# Patient Record
Sex: MALE | Race: WHITE | Employment: OTHER | URBAN - METROPOLITAN AREA
[De-identification: names, ages, dates, MRNs, and addresses within clinical notes are randomized per-mention and may not be internally consistent; named-entity substitution may affect disease eponyms.]

---

## 2018-09-14 PROBLEM — I77.1 TORTUOUS AORTA: Status: ACTIVE | Noted: 2018-09-14

## 2018-09-14 PROBLEM — I77.1 TORTUOUS AORTA (HCC): Status: ACTIVE | Noted: 2018-09-14

## 2019-06-27 ENCOUNTER — HOSPITAL ENCOUNTER (OUTPATIENT)
Facility: HOSPITAL | Age: 84
Setting detail: OBSERVATION
Discharge: HOME | End: 2019-06-28
Attending: EMERGENCY MEDICINE | Admitting: INTERNAL MEDICINE
Payer: MEDICARE

## 2019-06-27 ENCOUNTER — APPOINTMENT (OUTPATIENT)
Dept: CT IMAGING | Facility: HOSPITAL | Age: 84
End: 2019-06-27
Attending: EMERGENCY MEDICINE
Payer: MEDICARE

## 2019-06-27 ENCOUNTER — APPOINTMENT (OUTPATIENT)
Dept: GENERAL RADIOLOGY | Facility: HOSPITAL | Age: 84
End: 2019-06-27
Attending: HOSPITALIST
Payer: MEDICARE

## 2019-06-27 DIAGNOSIS — S01.01XA LACERATION OF SCALP, INITIAL ENCOUNTER: ICD-10-CM

## 2019-06-27 DIAGNOSIS — S09.90XA INJURY OF HEAD, INITIAL ENCOUNTER: Primary | ICD-10-CM

## 2019-06-27 DIAGNOSIS — N28.9 RENAL INSUFFICIENCY: ICD-10-CM

## 2019-06-27 DIAGNOSIS — R55 SYNCOPE AND COLLAPSE: ICD-10-CM

## 2019-06-27 LAB
ALBUMIN SERPL-MCNC: 4.1 G/DL (ref 3.4–5)
ALBUMIN/GLOB SERPL: 1.3 {RATIO} (ref 1–2)
ALP LIVER SERPL-CCNC: 84 U/L (ref 45–117)
ALT SERPL-CCNC: 20 U/L (ref 16–61)
ANION GAP SERPL CALC-SCNC: 5 MMOL/L (ref 0–18)
AST SERPL-CCNC: 14 U/L (ref 15–37)
ATRIAL RATE: 67 BPM
BASOPHILS # BLD AUTO: 0.05 X10(3) UL (ref 0–0.2)
BASOPHILS NFR BLD AUTO: 0.5 %
BILIRUB SERPL-MCNC: 0.6 MG/DL (ref 0.1–2)
BILIRUB UR QL STRIP.AUTO: NEGATIVE
BUN BLD-MCNC: 24 MG/DL (ref 7–18)
BUN/CREAT SERPL: 13.6 (ref 10–20)
CALCIUM BLD-MCNC: 9.2 MG/DL (ref 8.5–10.1)
CHLORIDE SERPL-SCNC: 108 MMOL/L (ref 98–112)
CLARITY UR REFRACT.AUTO: CLEAR
CO2 SERPL-SCNC: 28 MMOL/L (ref 21–32)
CREAT BLD-MCNC: 1.76 MG/DL (ref 0.7–1.3)
DEPRECATED RDW RBC AUTO: 43.6 FL (ref 35.1–46.3)
EOSINOPHIL # BLD AUTO: 0.07 X10(3) UL (ref 0–0.7)
EOSINOPHIL NFR BLD AUTO: 0.7 %
ERYTHROCYTE [DISTWIDTH] IN BLOOD BY AUTOMATED COUNT: 13.2 % (ref 11–15)
GLOBULIN PLAS-MCNC: 3.1 G/DL (ref 2.8–4.4)
GLUCOSE BLD-MCNC: 133 MG/DL (ref 70–99)
GLUCOSE UR STRIP.AUTO-MCNC: NEGATIVE MG/DL
HCT VFR BLD AUTO: 45.2 % (ref 39–53)
HGB BLD-MCNC: 14.9 G/DL (ref 13–17.5)
IMM GRANULOCYTES # BLD AUTO: 0.06 X10(3) UL (ref 0–1)
IMM GRANULOCYTES NFR BLD: 0.6 %
KETONES UR STRIP.AUTO-MCNC: NEGATIVE MG/DL
LEUKOCYTE ESTERASE UR QL STRIP.AUTO: NEGATIVE
LYMPHOCYTES # BLD AUTO: 0.81 X10(3) UL (ref 1–4)
LYMPHOCYTES NFR BLD AUTO: 7.7 %
M PROTEIN MFR SERPL ELPH: 7.2 G/DL (ref 6.4–8.2)
MCH RBC QN AUTO: 29.7 PG (ref 26–34)
MCHC RBC AUTO-ENTMCNC: 33 G/DL (ref 31–37)
MCV RBC AUTO: 90.2 FL (ref 80–100)
MONOCYTES # BLD AUTO: 0.72 X10(3) UL (ref 0.1–1)
MONOCYTES NFR BLD AUTO: 6.8 %
NEUTROPHILS # BLD AUTO: 8.82 X10 (3) UL (ref 1.5–7.7)
NEUTROPHILS # BLD AUTO: 8.82 X10(3) UL (ref 1.5–7.7)
NEUTROPHILS NFR BLD AUTO: 83.7 %
NITRITE UR QL STRIP.AUTO: NEGATIVE
OSMOLALITY SERPL CALC.SUM OF ELEC: 298 MOSM/KG (ref 275–295)
P AXIS: 55 DEGREES
P-R INTERVAL: 184 MS
PH UR STRIP.AUTO: 7 [PH] (ref 4.5–8)
PLATELET # BLD AUTO: 161 10(3)UL (ref 150–450)
POTASSIUM SERPL-SCNC: 4.2 MMOL/L (ref 3.5–5.1)
PROT UR STRIP.AUTO-MCNC: NEGATIVE MG/DL
Q-T INTERVAL: 436 MS
QRS DURATION: 92 MS
QTC CALCULATION (BEZET): 460 MS
R AXIS: 9 DEGREES
RBC # BLD AUTO: 5.01 X10(6)UL (ref 3.8–5.8)
SODIUM SERPL-SCNC: 141 MMOL/L (ref 136–145)
SP GR UR STRIP.AUTO: 1.01 (ref 1–1.03)
T AXIS: 43 DEGREES
T4 FREE SERPL-MCNC: 0.9 NG/DL (ref 0.8–1.7)
TSI SER-ACNC: 0.78 MIU/ML (ref 0.36–3.74)
UROBILINOGEN UR STRIP.AUTO-MCNC: <2 MG/DL
VENTRICULAR RATE: 67 BPM
WBC # BLD AUTO: 10.5 X10(3) UL (ref 4–11)

## 2019-06-27 PROCEDURE — 81001 URINALYSIS AUTO W/SCOPE: CPT | Performed by: HOSPITALIST

## 2019-06-27 PROCEDURE — 93005 ELECTROCARDIOGRAM TRACING: CPT

## 2019-06-27 PROCEDURE — 93010 ELECTROCARDIOGRAM REPORT: CPT

## 2019-06-27 PROCEDURE — 99285 EMERGENCY DEPT VISIT HI MDM: CPT

## 2019-06-27 PROCEDURE — 90471 IMMUNIZATION ADMIN: CPT

## 2019-06-27 PROCEDURE — 85025 COMPLETE CBC W/AUTO DIFF WBC: CPT | Performed by: EMERGENCY MEDICINE

## 2019-06-27 PROCEDURE — 71045 X-RAY EXAM CHEST 1 VIEW: CPT | Performed by: HOSPITALIST

## 2019-06-27 PROCEDURE — 70450 CT HEAD/BRAIN W/O DYE: CPT | Performed by: EMERGENCY MEDICINE

## 2019-06-27 PROCEDURE — 84439 ASSAY OF FREE THYROXINE: CPT | Performed by: HOSPITALIST

## 2019-06-27 PROCEDURE — 96361 HYDRATE IV INFUSION ADD-ON: CPT

## 2019-06-27 PROCEDURE — 96360 HYDRATION IV INFUSION INIT: CPT

## 2019-06-27 PROCEDURE — 84443 ASSAY THYROID STIM HORMONE: CPT | Performed by: HOSPITALIST

## 2019-06-27 PROCEDURE — 80053 COMPREHEN METABOLIC PANEL: CPT | Performed by: EMERGENCY MEDICINE

## 2019-06-27 RX ORDER — AMLODIPINE BESYLATE 10 MG/1
10 TABLET ORAL DAILY
Status: DISCONTINUED | OUTPATIENT
Start: 2019-06-28 | End: 2019-06-28

## 2019-06-27 RX ORDER — TETANUS AND DIPHTHERIA TOXOIDS ADSORBED 2; 2 [LF]/.5ML; [LF]/.5ML
0.5 INJECTION INTRAMUSCULAR ONCE
Status: COMPLETED | OUTPATIENT
Start: 2019-06-27 | End: 2019-06-27

## 2019-06-27 RX ORDER — AMLODIPINE BESYLATE 10 MG/1
10 TABLET ORAL DAILY
COMMUNITY
End: 2020-02-04

## 2019-06-27 RX ORDER — ALFUZOSIN HYDROCHLORIDE 10 MG/1
10 TABLET, EXTENDED RELEASE ORAL NIGHTLY
Status: DISCONTINUED | OUTPATIENT
Start: 2019-06-27 | End: 2019-06-28

## 2019-06-27 RX ORDER — FINASTERIDE 5 MG/1
5 TABLET, FILM COATED ORAL NIGHTLY
Status: DISCONTINUED | OUTPATIENT
Start: 2019-06-27 | End: 2019-06-28

## 2019-06-27 RX ORDER — LISINOPRIL 10 MG/1
10 TABLET ORAL DAILY
Status: ON HOLD | COMMUNITY
End: 2019-06-28

## 2019-06-27 RX ORDER — OXYBUTYNIN CHLORIDE 5 MG/1
5 TABLET, EXTENDED RELEASE ORAL NIGHTLY
Status: DISCONTINUED | OUTPATIENT
Start: 2019-06-27 | End: 2019-06-28

## 2019-06-27 RX ORDER — ALFUZOSIN HYDROCHLORIDE 10 MG/1
10 TABLET, EXTENDED RELEASE ORAL NIGHTLY
COMMUNITY
End: 2019-08-15

## 2019-06-27 RX ORDER — FINASTERIDE 5 MG/1
5 TABLET, FILM COATED ORAL NIGHTLY
COMMUNITY
End: 2020-02-04

## 2019-06-27 RX ORDER — OXYBUTYNIN CHLORIDE 5 MG/1
5 TABLET, EXTENDED RELEASE ORAL NIGHTLY
COMMUNITY
End: 2019-07-02

## 2019-06-27 RX ORDER — POLYETHYLENE GLYCOL 3350 17 G/17G
17 POWDER, FOR SOLUTION ORAL DAILY
COMMUNITY
End: 2021-01-08 | Stop reason: ALTCHOICE

## 2019-06-27 NOTE — H&P
[facility] Hospitalist History and Physical      CC: Fall/syncope    PCP: Musa Kent MD    History of Present Illness: Patient is a 81year old male with PMH sig for BPH and HTN CKD presents after episode of syncope and fall.  He was working in his garage nightly. Disp:  Rfl:    amLODIPine Besylate 10 MG Oral Tab Take 10 mg by mouth daily. Disp:  Rfl:    lisinopril 10 MG Oral Tab Take 10 mg by mouth daily. Disp:  Rfl:    PEG 3350 Oral Powd Pack Take 17 g by mouth daily.  Disp:  Rfl:    Alfuzosin HCl ER 10 06/27/2019    ALB 4.1 06/27/2019    ALKPHO 84 06/27/2019    BILT 0.6 06/27/2019    TP 7.2 06/27/2019    AST 14 06/27/2019    ALT 20 06/27/2019       Above labs reviewed.     Radiology:    I independently reviewed the following studies from admit date:     E currently    # Hx of HTN  - On lisinopril and norvasc at home- repots some lower BP readings at home lately  - Hold and monitor BP    # CKD: baseline, monitor    # BPH: UA looks OK, home meds    # Proph: SCD Sandy Ormond, MD  [facility] Hospitalist  Pager: [phone]

## 2019-06-27 NOTE — ED INITIAL ASSESSMENT (HPI)
Per pts son pt stated he was dizzy and he passed out and fell on his back. Pt was outside working outside. Per pts son pt did not remember the episode. Pt states he does not remember what happened.      Pt has laceration and bump on the back of

## 2019-06-27 NOTE — PROGRESS NOTES
06/27/19 1801 06/27/19 1802 06/27/19 1804   Vital Signs   /70 156/67 148/66   BP Location Left arm Left arm Left arm   BP Method Automatic Automatic Automatic   Patient Position Lying Sitting Standing

## 2019-06-27 NOTE — PLAN OF CARE
Received from ED into [address] [place] [address]  Admission navigator completed. Orders reviewed. CXR completed, Echo pending. Will monitor.

## 2019-06-27 NOTE — ED PROVIDER NOTES
Patient Seen in: [facility] Emergency Department    History   Patient presents with:  Syncope (cardiovascular, neurologic)    Stated Complaint: syncope    81-year-old male who presents to the emergency room with family after having a syncopal episod Performed by Ava Albright MD at [facility] N/A 1/29/2016    Performed by Ava Albright MD at [facility] MAIN OR   • CYSTOSCOPY, STENT EXCHANGE Bilateral 9/16/2016    Performed by Ava Albright MD at [facility]: He is oriented to person, place, and time. He appears well-developed and well-nourished. HENT:   Head: Normocephalic and atraumatic.        Right Ear: External ear normal.   Left Ear: External ear normal.   Mouth/Throat: Oropharynx is arcelia Rate, intervals and axes as noted on EKG Report. Rate: 67  Rhythm: Sinus Rhythm, QT has shortened  Reading: leela  Ct Brain Or Head (95825)    Result Date: 6/27/2019  PROCEDURE:  CT BRAIN OR HEAD (24861)  COMPARISON:  None.   INDICATIONS:  head pain or injury Head injury S09.90XA 6/27/2019 Unknown

## 2019-06-28 ENCOUNTER — APPOINTMENT (OUTPATIENT)
Dept: CV DIAGNOSTICS | Facility: HOSPITAL | Age: 84
End: 2019-06-28
Attending: HOSPITALIST
Payer: MEDICARE

## 2019-06-28 ENCOUNTER — APPOINTMENT (OUTPATIENT)
Dept: ULTRASOUND IMAGING | Facility: HOSPITAL | Age: 84
End: 2019-06-28
Attending: HOSPITALIST
Payer: MEDICARE

## 2019-06-28 VITALS
WEIGHT: 158.06 LBS | BODY MASS INDEX: 23.95 KG/M2 | RESPIRATION RATE: 18 BRPM | HEART RATE: 64 BPM | OXYGEN SATURATION: 97 % | DIASTOLIC BLOOD PRESSURE: 62 MMHG | HEIGHT: 68 IN | TEMPERATURE: 98 F | SYSTOLIC BLOOD PRESSURE: 117 MMHG

## 2019-06-28 PROCEDURE — 93306 TTE W/DOPPLER COMPLETE: CPT | Performed by: HOSPITALIST

## 2019-06-28 PROCEDURE — 93880 EXTRACRANIAL BILAT STUDY: CPT | Performed by: HOSPITALIST

## 2019-06-28 NOTE — PROGRESS NOTES
Assumed care of patient at 07:30am  Pt A/O x 4. VSS, NSR per tele, SPO2 > 95% on RA  Pt denies any pain or discomfort  Posterior head laceration SIMON, clean and dry  ECHO and US carotids completed  Plan to discharge home this afternoon/evening with wife.

## 2019-06-28 NOTE — DISCHARGE SUMMARY
General Medicine Discharge Summary     Patient ID:  Xiomara Palomo  81year old  9/10/1935    Admit date: 6/27/2019    Discharge date and time:  6/28/19    Attending Physician: Santiago Desouza MD have decreased home BP meds and will send on event monitor.  FU with Cards as outpatient when monitor read.     # Head trauma  - CT head OK, local wound care  - Discussed concussive symptoms- confusion post fall may have been related to minor concussion  -

## 2019-06-28 NOTE — PROGRESS NOTES
NURSING DISCHARGE NOTE    Patient and wife given and reviewed discharge orders. All questions answered, verbalized understanding. Signa and symptoms to call 911 and return to hospital reviewed. All questions answered, verbalized understanding.   Wesley

## 2019-06-28 NOTE — PLAN OF CARE
Assumed care at 1900, patient alert and oriented X 4, forgetful at times  Patient denies pain  Laceration and bruising to back of head  Plan for echo and carotid us  Updated patient and family on POC         Problem: SAFETY ADULT - FALL  Goal: Free from fa

## 2019-06-28 NOTE — PLAN OF CARE
Problem: Patient/Family Goals  Goal: Patient/Family Long Term Goal  Description  Patient's Long Term Goal: Discharge home    Interventions:  - ECHO and US carotids completed  - follow-up appointments   - See additional Care Plan goals for specific interv patient needs post-hospital services based on physician/LIP order or complex needs related to functional status, cognitive ability or social support system  Outcome: Adequate for Discharge

## 2019-07-01 ENCOUNTER — HOSPITAL ENCOUNTER (OUTPATIENT)
Dept: CV DIAGNOSTICS | Facility: HOSPITAL | Age: 84
Discharge: HOME | End: 2019-07-01
Attending: NURSE PRACTITIONER
Payer: MEDICARE

## 2019-07-01 DIAGNOSIS — R55 SYNCOPE AND COLLAPSE: ICD-10-CM

## 2019-07-01 PROCEDURE — 93272 ECG/REVIEW INTERPRET ONLY: CPT | Performed by: NURSE PRACTITIONER

## 2019-07-01 PROCEDURE — 93270 REMOTE 30 DAY ECG REV/REPORT: CPT | Performed by: NURSE PRACTITIONER

## 2019-07-01 PROCEDURE — 93271 ECG/MONITORING AND ANALYSIS: CPT | Performed by: NURSE PRACTITIONER

## 2019-07-09 PROBLEM — N18.30 CKD (CHRONIC KIDNEY DISEASE) STAGE 3, GFR 30-59 ML/MIN (HCC): Status: ACTIVE | Noted: 2019-07-09

## 2019-07-09 PROBLEM — I77.9 LEFT-SIDED CAROTID ARTERY DISEASE (HCC): Status: ACTIVE | Noted: 2019-07-09

## 2019-07-09 PROBLEM — I77.9 LEFT-SIDED CAROTID ARTERY DISEASE: Status: ACTIVE | Noted: 2019-07-09

## 2019-07-19 PROBLEM — R55 SYNCOPE AND COLLAPSE: Status: RESOLVED | Noted: 2019-06-27 | Resolved: 2019-07-19

## 2021-01-07 PROBLEM — I77.9 LEFT-SIDED CAROTID ARTERY DISEASE (HCC): Status: RESOLVED | Noted: 2019-07-09 | Resolved: 2021-01-07

## 2021-01-07 PROBLEM — I77.9 LEFT-SIDED CAROTID ARTERY DISEASE: Status: RESOLVED | Noted: 2019-07-09 | Resolved: 2021-01-07

## 2021-01-07 PROBLEM — G31.9 CEREBRAL ATROPHY (HCC): Status: ACTIVE | Noted: 2021-01-07

## 2021-01-07 PROBLEM — I65.29 CAROTID STENOSIS: Status: ACTIVE | Noted: 2021-01-07

## 2021-01-07 PROBLEM — G31.9 CEREBRAL ATROPHY: Status: ACTIVE | Noted: 2021-01-07

## 2021-12-11 ENCOUNTER — APPOINTMENT (OUTPATIENT)
Dept: GENERAL RADIOLOGY | Facility: HOSPITAL | Age: 86
End: 2021-12-11
Attending: EMERGENCY MEDICINE
Payer: MEDICARE

## 2021-12-11 ENCOUNTER — HOSPITAL ENCOUNTER (OUTPATIENT)
Facility: HOSPITAL | Age: 86
Setting detail: OBSERVATION
Discharge: HOME | End: 2021-12-13
Attending: EMERGENCY MEDICINE | Admitting: HOSPITALIST
Payer: MEDICARE

## 2021-12-11 DIAGNOSIS — R55 SYNCOPE AND COLLAPSE: Primary | ICD-10-CM

## 2021-12-11 PROBLEM — R73.9 HYPERGLYCEMIA: Status: ACTIVE | Noted: 2021-12-11

## 2021-12-11 PROCEDURE — 99285 EMERGENCY DEPT VISIT HI MDM: CPT | Performed by: EMERGENCY MEDICINE

## 2021-12-11 PROCEDURE — 96361 HYDRATE IV INFUSION ADD-ON: CPT | Performed by: EMERGENCY MEDICINE

## 2021-12-11 PROCEDURE — 80053 COMPREHEN METABOLIC PANEL: CPT | Performed by: EMERGENCY MEDICINE

## 2021-12-11 PROCEDURE — 84484 ASSAY OF TROPONIN QUANT: CPT | Performed by: EMERGENCY MEDICINE

## 2021-12-11 PROCEDURE — 81003 URINALYSIS AUTO W/O SCOPE: CPT | Performed by: EMERGENCY MEDICINE

## 2021-12-11 PROCEDURE — 93010 ELECTROCARDIOGRAM REPORT: CPT | Performed by: EMERGENCY MEDICINE

## 2021-12-11 PROCEDURE — B246ZZZ ULTRASONOGRAPHY OF RIGHT AND LEFT HEART: ICD-10-PCS | Performed by: INTERNAL MEDICINE

## 2021-12-11 PROCEDURE — 93005 ELECTROCARDIOGRAM TRACING: CPT

## 2021-12-11 PROCEDURE — 85025 COMPLETE CBC W/AUTO DIFF WBC: CPT | Performed by: EMERGENCY MEDICINE

## 2021-12-11 PROCEDURE — 71045 X-RAY EXAM CHEST 1 VIEW: CPT | Performed by: EMERGENCY MEDICINE

## 2021-12-11 PROCEDURE — 96360 HYDRATION IV INFUSION INIT: CPT | Performed by: EMERGENCY MEDICINE

## 2021-12-11 RX ORDER — FINASTERIDE 5 MG/1
5 TABLET, FILM COATED ORAL DAILY
Status: DISCONTINUED | OUTPATIENT
Start: 2021-12-12 | End: 2021-12-13

## 2021-12-11 RX ORDER — ENOXAPARIN SODIUM 100 MG/ML
40 INJECTION SUBCUTANEOUS DAILY
Status: DISCONTINUED | OUTPATIENT
Start: 2021-12-11 | End: 2021-12-13

## 2021-12-11 RX ORDER — AMLODIPINE BESYLATE 5 MG/1
10 TABLET ORAL DAILY
Status: DISCONTINUED | OUTPATIENT
Start: 2021-12-12 | End: 2021-12-13

## 2021-12-11 RX ORDER — TAMSULOSIN HYDROCHLORIDE 0.4 MG/1
0.4 CAPSULE ORAL DAILY
Refills: 3 | Status: DISCONTINUED | OUTPATIENT
Start: 2021-12-12 | End: 2021-12-13

## 2021-12-11 NOTE — H&P
[facility] Hospitalist H&P/Consult note       CC: Patient presents with:  Syncope       PCP: Geno Bruce MD    History of Present Illness: Patient is a 81year old male with PMH sig for htn, hld, bph, here for syncope    He was going up the stairs from ba Packs/day: 0.00        Quit date: 2015        Years since quittin.0      Smokeless tobacco: Former User      Tobacco comment: chewed for 40 yrs    Alcohol use: Yes      Comment: occasionally       Fam Hx  Family History   Problem Relation Age of O 6/27/2019, 6:34 PM.  [facility] , XR, XR CHEST AP PORTABLE  (CPT=71010), 1/11/2016, 0:06 AM.  INDICATIONS:  syncope  PATIENT STATED HISTORY: (As transcribed by Technologist)  Patient offered no additional history at this time.     FINDINGS:  Heart size is withi

## 2021-12-11 NOTE — PROGRESS NOTES
12/11/21 1441 12/11/21 1442 12/11/21 1443   Vital Signs   Temp 98.2 °F (36.8 °C)  --   --    Pulse 77 92 91   Cardiac Rhythm NSR NSR NSR   Resp 17 21 16   /71 (!) 125/108 145/63   MAP (mmHg) 87 112 81   BP Location Left arm Left arm Left arm   BP

## 2021-12-11 NOTE — ED PROVIDER NOTES
Patient Seen in: [facility] Emergency Department      History   Patient presents with:  Syncope    Stated Complaint: syncope     Subjective:   HPI    Patient presents after syncopal episode. The wife gives a lot of the history.   The patient had mad • OTHER SURGICAL HISTORY  16    Cysto, TURP - Dr. Isa Medina                Social History    Tobacco Use      Smoking status: Former Smoker        Packs/day: 0.00        Quit date: 2015        Years since quittin.0      Smokeless tobacco: Normal   REDRAW POTASSIUM (P) - Normal   RAPID SARS-COV-2 BY PCR - Normal   CBC WITH DIFFERENTIAL WITH PLATELET    Narrative: The following orders were created for panel order CBC With Differential With Platelet.   Procedure for further observation to Dr. Lexis Cm from the [facility] service.   Admission disposition: 12/11/2021 11:36 AM                    Disposition and Plan     Clinical Impression:  Syncope and collapse  (primary encounter diagnosis)     Disposition:  A

## 2021-12-11 NOTE — ED INITIAL ASSESSMENT (HPI)
Pt presents from home via EMS. Per ems, pt had syncopal episode at home. Blood sugar 116. Pt states he \"feels pretty good, i'm a little dizzy. I have to poop. \"

## 2021-12-11 NOTE — ED QUICK NOTES
Orders for admission, patient is aware of plan and ready to go upstairs. Any questions, please call ED RN Wang Bryant at extension [phone].      Patient Covid vaccination status: Unvaccinated     COVID Test Ordered in ED: Rapid SARS-CoV-2 by PCR    COVID Suspicio

## 2021-12-12 ENCOUNTER — APPOINTMENT (OUTPATIENT)
Dept: CV DIAGNOSTICS | Facility: HOSPITAL | Age: 86
End: 2021-12-12
Attending: HOSPITALIST
Payer: MEDICARE

## 2021-12-12 ENCOUNTER — APPOINTMENT (OUTPATIENT)
Dept: ULTRASOUND IMAGING | Facility: HOSPITAL | Age: 86
End: 2021-12-12
Attending: HOSPITALIST
Payer: MEDICARE

## 2021-12-12 PROCEDURE — 93306 TTE W/DOPPLER COMPLETE: CPT | Performed by: HOSPITALIST

## 2021-12-12 PROCEDURE — 97161 PT EVAL LOW COMPLEX 20 MIN: CPT

## 2021-12-12 PROCEDURE — 83735 ASSAY OF MAGNESIUM: CPT | Performed by: HOSPITALIST

## 2021-12-12 PROCEDURE — 93880 EXTRACRANIAL BILAT STUDY: CPT | Performed by: HOSPITALIST

## 2021-12-12 PROCEDURE — 80048 BASIC METABOLIC PNL TOTAL CA: CPT | Performed by: HOSPITALIST

## 2021-12-12 PROCEDURE — 97116 GAIT TRAINING THERAPY: CPT

## 2021-12-12 NOTE — PLAN OF CARE
Assumed care of patient at [address]. Alert and oriented x4. Denies any dizziness or light-headedness. Patient is very eager to go home tomorrow. NSR on telemetry. On room air. BM today. Continent of bowel and bladder. Denies any pain.  Fall precautions in place

## 2021-12-12 NOTE — PROGRESS NOTES
[facility] Hospitalist Progress Note     CC: Hospital Follow up    PCP: Annamarie Lloyd MD    Late note entry seen and examined 12/12   Assessment/Plan:     Principal Problem:    Syncope and collapse  Active Problems:    Hyperglycemia    81year old male with 30.1   MCHC 33.7   RDW 13.0   NEPRELIM 2.84   WBC 5.5   .0         Recent Labs   Lab 12/11/21  0853 12/11/21  0957 12/12/21  0630   *  --  100*   BUN 14  --  12   CREATSERUM 1.41*  --  1.31*   GFRAA 52*  --  57*   GFRNAA 45*  --  49*   CA 8.7

## 2021-12-12 NOTE — PHYSICAL THERAPY NOTE
PHYSICAL THERAPY EVALUATION - INPATIENT     Room Number: 1803/2785-J  Evaluation Date: 12/12/2021  Type of Evaluation: Initial  Physician Order: PT Eval and Treat    Presenting Problem: syncope  Co-Morbidities : HTN, HLD, BPH  Reason for Therapy:  Mob extremity ROM is within functional limits     Lower extremity strength is within functional limits       BALANCE  Static Sitting: Normal  Dynamic Sitting: Normal  Static Standing: Normal  Dynamic Standing: Normal    ADDITIONAL TESTS Provided:  Discussed role of PT. Patient was instructed on safety during bed mobility/transfers/gait/standing/stair negotiation, slow position change. Patient End of Session: In bed;Needs met;Call light within reach;RN aware of session/findings; All pat

## 2021-12-12 NOTE — PROGRESS NOTES
12/12/21 1311   Clinical Encounter Type   Visited With Patient; Health care provider   Routine Visit Introduction   Continue Visiting No   Responded to consult for POLST. RN stated that patient is decisional at this time. Patient signed POLST.   Mignon

## 2021-12-12 NOTE — PLAN OF CARE
NSR on telemetry. VSS. No c/o cardiac symptoms. Ambulating with standby assist in marcos and denies feeling lightheaded or dizzy. Echo completed this A.M. Awaiting carotid ultra sound. On room air. SPO2 remaining >90%. No c/o shortness of breath.  Lung so

## 2021-12-13 VITALS
BODY MASS INDEX: 24.26 KG/M2 | TEMPERATURE: 98 F | RESPIRATION RATE: 18 BRPM | DIASTOLIC BLOOD PRESSURE: 73 MMHG | HEIGHT: 68 IN | WEIGHT: 160.06 LBS | HEART RATE: 56 BPM | OXYGEN SATURATION: 99 % | SYSTOLIC BLOOD PRESSURE: 156 MMHG

## 2021-12-13 NOTE — PROGRESS NOTES
Up walking in the hallway. Denies c/o discomfort. No sob room air. Wife on bedside. DC instruction given-verbalized understanding. DC tele. dc hl.

## 2021-12-13 NOTE — DISCHARGE SUMMARY
General Medicine Discharge Summary     Patient ID:  Jack Oviedo  81year old  9/10/1935    Admit date: 12/11/2021    Discharge date and time: 12/13/2021 11:48 AM     Attending Physician: No att. providers found     Consults: IP CONSULT TO [address] [place] [address] daily.     finasteride 5 MG Tabs  Commonly known as: PROSCAR  Take 1 tablet (5 mg total) by mouth daily. FU   Follow-up Information     48 hour heart monitor On 12/15/2021.     Why: @ 1:30pm for placement of monitor  Contact information:  Jose D Norwood

## 2021-12-13 NOTE — PLAN OF CARE
Patient AOX4. O2 sat >90% on room air. NSR on tele. VSS. Denies pain or dizziness. Awaiting echo results. Patient expresses desire to go home tomorrow. Will endorse to care team in a.m. Patient updated on plan of care.     Problem: [address]

## 2022-02-11 PROBLEM — I12.9 HYPERTENSIVE KIDNEY DISEASE WITH CKD (CHRONIC KIDNEY DISEASE): Status: ACTIVE | Noted: 2022-02-11

## 2022-02-11 PROBLEM — R55 SYNCOPE AND COLLAPSE: Status: RESOLVED | Noted: 2021-12-11 | Resolved: 2022-02-11

## 2022-03-17 ENCOUNTER — HOSPITAL ENCOUNTER (OUTPATIENT)
Facility: HOSPITAL | Age: 87
Setting detail: OBSERVATION
Discharge: HOME | End: 2022-03-18
Attending: EMERGENCY MEDICINE | Admitting: INTERNAL MEDICINE
Payer: MEDICARE

## 2022-03-17 DIAGNOSIS — K92.2 GASTROINTESTINAL HEMORRHAGE, UNSPECIFIED GASTROINTESTINAL HEMORRHAGE TYPE: Primary | ICD-10-CM

## 2022-03-17 DIAGNOSIS — K92.1 HEMATOCHEZIA: ICD-10-CM

## 2022-03-17 LAB
C DIFF TOX B STL QL: POSITIVE
HGB BLD-MCNC: 15.2 G/DL
RH BLOOD TYPE: POSITIVE
SARS-COV-2 RNA RESP QL NAA+PROBE: NOT DETECTED

## 2022-03-17 PROCEDURE — 96361 HYDRATE IV INFUSION ADD-ON: CPT

## 2022-03-17 PROCEDURE — 86850 RBC ANTIBODY SCREEN: CPT

## 2022-03-17 PROCEDURE — 99285 EMERGENCY DEPT VISIT HI MDM: CPT

## 2022-03-17 PROCEDURE — 82272 OCCULT BLD FECES 1-3 TESTS: CPT

## 2022-03-17 PROCEDURE — 87507 IADNA-DNA/RNA PROBE TQ 12-25: CPT | Performed by: EMERGENCY MEDICINE

## 2022-03-17 PROCEDURE — 86901 BLOOD TYPING SEROLOGIC RH(D): CPT

## 2022-03-17 PROCEDURE — 86850 RBC ANTIBODY SCREEN: CPT | Performed by: EMERGENCY MEDICINE

## 2022-03-17 PROCEDURE — 86900 BLOOD TYPING SEROLOGIC ABO: CPT

## 2022-03-17 PROCEDURE — 87493 C DIFF AMPLIFIED PROBE: CPT | Performed by: EMERGENCY MEDICINE

## 2022-03-17 PROCEDURE — 86900 BLOOD TYPING SEROLOGIC ABO: CPT | Performed by: EMERGENCY MEDICINE

## 2022-03-17 PROCEDURE — 85018 HEMOGLOBIN: CPT | Performed by: HOSPITALIST

## 2022-03-17 PROCEDURE — 86901 BLOOD TYPING SEROLOGIC RH(D): CPT | Performed by: EMERGENCY MEDICINE

## 2022-03-17 PROCEDURE — 96360 HYDRATION IV INFUSION INIT: CPT

## 2022-03-17 RX ORDER — SODIUM CHLORIDE 9 MG/ML
INJECTION, SOLUTION INTRAVENOUS ONCE
Status: COMPLETED | OUTPATIENT
Start: 2022-03-17 | End: 2022-03-17

## 2022-03-17 RX ORDER — FINASTERIDE 5 MG/1
5 TABLET, FILM COATED ORAL DAILY
COMMUNITY

## 2022-03-17 RX ORDER — SODIUM CHLORIDE 9 MG/ML
INJECTION, SOLUTION INTRAVENOUS CONTINUOUS
Status: ACTIVE | OUTPATIENT
Start: 2022-03-17 | End: 2022-03-17

## 2022-03-17 RX ORDER — TAMSULOSIN HYDROCHLORIDE 0.4 MG/1
0.4 CAPSULE ORAL DAILY
Status: DISCONTINUED | OUTPATIENT
Start: 2022-03-18 | End: 2022-03-18

## 2022-03-17 RX ORDER — SODIUM CHLORIDE 9 MG/ML
INJECTION, SOLUTION INTRAVENOUS CONTINUOUS
Status: DISCONTINUED | OUTPATIENT
Start: 2022-03-17 | End: 2022-03-18

## 2022-03-17 RX ORDER — ALFUZOSIN HYDROCHLORIDE 10 MG/1
10 TABLET, EXTENDED RELEASE ORAL DAILY
COMMUNITY

## 2022-03-17 RX ORDER — VANCOMYCIN HYDROCHLORIDE 125 MG/1
125 CAPSULE ORAL EVERY 6 HOURS
Status: DISCONTINUED | OUTPATIENT
Start: 2022-03-17 | End: 2022-03-18

## 2022-03-17 RX ORDER — FINASTERIDE 5 MG/1
5 TABLET, FILM COATED ORAL DAILY
Status: DISCONTINUED | OUTPATIENT
Start: 2022-03-18 | End: 2022-03-18

## 2022-03-17 RX ORDER — ONDANSETRON 2 MG/ML
4 INJECTION INTRAMUSCULAR; INTRAVENOUS EVERY 6 HOURS PRN
Status: DISCONTINUED | OUTPATIENT
Start: 2022-03-17 | End: 2022-03-18

## 2022-03-17 NOTE — ED INITIAL ASSESSMENT (HPI)
Pt arrives via EMS from [address], has been having bleeding from the rectum yesterday morning. States \"a shot of blood comes out every time I go to the bathroom\". Denies SOB or dizzyness. A&O x4.

## 2022-03-17 NOTE — ED QUICK NOTES
Orders for admission, patient is aware of plan and ready to go upstairs. Any questions, please call ED RN Carleton Merlin at extension [phone]    Vaccinated? Yes  Type of COVID test sent: Rapid  COVID Suspicion level: Low      Titratable drug(s) infusing:   Rate: N/A    LOC at time of transport: A&O x2, forgetful per norm. Other pertinent information: Pt has hx of dementia, lives at home with wife.  Ambulates with assist.    CIWA score=  NIH score=

## 2022-03-17 NOTE — PROGRESS NOTES
NURSING ADMISSION NOTE      Patient admitted via Cart  Oriented to room 524. Safety precautions initiated. Bed in low position. Call light in reach. Received pt Aox4 - forgetful at times. Hx of Dementia. Alutiiq with bilateral hearing aids. Glasses. Upper and lower dentures.  on RA. Afebrile. NSR on tele. IVF at 75mL/hr. NPO per GI. Voids. Incontinent at times. Up standby. No further needs at this time.

## 2022-03-18 VITALS
WEIGHT: 162.5 LBS | HEIGHT: 68 IN | TEMPERATURE: 98 F | RESPIRATION RATE: 13 BRPM | DIASTOLIC BLOOD PRESSURE: 55 MMHG | OXYGEN SATURATION: 97 % | BODY MASS INDEX: 24.63 KG/M2 | HEART RATE: 67 BPM | SYSTOLIC BLOOD PRESSURE: 140 MMHG

## 2022-03-18 LAB
ADENOVIRUS F 40/41 PCR: NEGATIVE
ANION GAP SERPL CALC-SCNC: 8 MMOL/L (ref 0–18)
ASTROVIRUS PCR: NEGATIVE
BASOPHILS # BLD AUTO: 0.09 X10(3) UL (ref 0–0.2)
BASOPHILS NFR BLD AUTO: 1.1 %
BUN BLD-MCNC: 12 MG/DL (ref 7–18)
C CAYETANENSIS DNA SPEC QL NAA+PROBE: NEGATIVE
CALCIUM BLD-MCNC: 8.6 MG/DL (ref 8.5–10.1)
CAMPY SP DNA.DIARRHEA STL QL NAA+PROBE: NEGATIVE
CHLORIDE SERPL-SCNC: 111 MMOL/L (ref 98–112)
CO2 SERPL-SCNC: 22 MMOL/L (ref 21–32)
CREAT BLD-MCNC: 1.21 MG/DL
CRYPTOSP DNA SPEC QL NAA+PROBE: NEGATIVE
EAEC PAA PLAS AGGR+AATA ST NAA+NON-PRB: NEGATIVE
EC STX1+STX2 + H7 FLIC SPEC NAA+PROBE: NEGATIVE
ENTAMOEBA HISTOLYTICA PCR: NEGATIVE
EOSINOPHIL # BLD AUTO: 0.31 X10(3) UL (ref 0–0.7)
EOSINOPHIL NFR BLD AUTO: 3.7 %
EPEC EAE GENE STL QL NAA+NON-PROBE: NEGATIVE
ERYTHROCYTE [DISTWIDTH] IN BLOOD BY AUTOMATED COUNT: 13.2 %
ETEC LTA+ST1A+ST1B TOX ST NAA+NON-PROBE: NEGATIVE
GIARDIA LAMBLIA PCR: NEGATIVE
GLUCOSE BLD-MCNC: 88 MG/DL (ref 70–99)
HCT VFR BLD AUTO: 42.2 %
HGB BLD-MCNC: 14.2 G/DL
IMM GRANULOCYTES # BLD AUTO: 0.04 X10(3) UL (ref 0–1)
IMM GRANULOCYTES NFR BLD: 0.5 %
LYMPHOCYTES # BLD AUTO: 1.36 X10(3) UL (ref 1–4)
LYMPHOCYTES NFR BLD AUTO: 16.4 %
MCH RBC QN AUTO: 29.7 PG (ref 26–34)
MCHC RBC AUTO-ENTMCNC: 33.6 G/DL (ref 31–37)
MCV RBC AUTO: 88.3 FL
MONOCYTES # BLD AUTO: 1 X10(3) UL (ref 0.1–1)
MONOCYTES NFR BLD AUTO: 12.1 %
NEUTROPHILS # BLD AUTO: 5.48 X10 (3) UL (ref 1.5–7.7)
NEUTROPHILS # BLD AUTO: 5.48 X10(3) UL (ref 1.5–7.7)
NEUTROPHILS NFR BLD AUTO: 66.2 %
NOROVIRUS GI/GII PCR: NEGATIVE
OSMOLALITY SERPL CALC.SUM OF ELEC: 291 MOSM/KG (ref 275–295)
PLATELET # BLD AUTO: 152 10(3)UL (ref 150–450)
POTASSIUM SERPL-SCNC: 3.6 MMOL/L (ref 3.5–5.1)
RBC # BLD AUTO: 4.78 X10(6)UL
ROTAVIRUS A PCR: NEGATIVE
SALMONELLA DNA SPEC QL NAA+PROBE: NEGATIVE
SAPOVIRUS PCR: NEGATIVE
SHIGELLA SP+EIEC IPAH ST NAA+NON-PROBE: NEGATIVE
SODIUM SERPL-SCNC: 141 MMOL/L (ref 136–145)
V CHOLERAE DNA SPEC QL NAA+PROBE: NEGATIVE
VIBRIO DNA SPEC NAA+PROBE: NEGATIVE
WBC # BLD AUTO: 8.3 X10(3) UL (ref 4–11)
YERSINIA DNA SPEC NAA+PROBE: NEGATIVE

## 2022-03-18 PROCEDURE — 80048 BASIC METABOLIC PNL TOTAL CA: CPT | Performed by: HOSPITALIST

## 2022-03-18 PROCEDURE — 85025 COMPLETE CBC W/AUTO DIFF WBC: CPT | Performed by: HOSPITALIST

## 2022-03-18 RX ORDER — VANCOMYCIN HYDROCHLORIDE 125 MG/1
125 CAPSULE ORAL EVERY 6 HOURS
Qty: 56 CAPSULE | Refills: 0 | Status: SHIPPED | OUTPATIENT
Start: 2022-03-18 | End: 2022-04-01

## 2022-03-18 NOTE — DISCHARGE PLANNING
NURSING DISCHARGE NOTE    Discharged Home via Wheelchair. Accompanied by Family member and Spouse  Belongings Taken by patient/family. Discharge navigator complete  Discharge instructions reviewed with patient & spouse at bedside  All questions & concerns addressed at this time.

## 2022-03-18 NOTE — CM/SW NOTE
Prior auth for po vanco sent via [email or web address], (Key: [id number]) approved. Norma rOr at [address] [place] [address] made aware via secure chat.     Ulises Power RN,   [id number]

## 2022-03-18 NOTE — PROGRESS NOTES
AO x4. Forgetful at times, history of dementia. Pala. Bilateral hearing aids. Glasses. Upper and lower dentures. RA. Tele - NSR. SCDs. Incontinent at times. Positive C diffe. Noreports of pain. Up standby x1. NPO except sips with meds. Vancomycin for cdiffe. Getting fluids 0.9 @ 75. Will continue to round on patient. Pt updated on POC. Paged Emilio: + Cdiffe - Vancomycin ordered & sips with meds.

## 2022-07-19 ENCOUNTER — ANESTHESIA EVENT (OUTPATIENT)
Dept: ENDOSCOPY | Facility: HOSPITAL | Age: 87
End: 2022-07-19
Payer: MEDICARE

## 2022-07-20 ENCOUNTER — HOSPITAL ENCOUNTER (OUTPATIENT)
Facility: HOSPITAL | Age: 87
Setting detail: HOSPITAL OUTPATIENT SURGERY
Discharge: HOME | End: 2022-07-20
Attending: INTERNAL MEDICINE | Admitting: INTERNAL MEDICINE
Payer: MEDICARE

## 2022-07-20 ENCOUNTER — ANESTHESIA (OUTPATIENT)
Dept: ENDOSCOPY | Facility: HOSPITAL | Age: 87
End: 2022-07-20
Payer: MEDICARE

## 2022-07-20 VITALS
TEMPERATURE: 97 F | SYSTOLIC BLOOD PRESSURE: 102 MMHG | BODY MASS INDEX: 24.25 KG/M2 | HEIGHT: 68 IN | RESPIRATION RATE: 18 BRPM | HEART RATE: 52 BPM | OXYGEN SATURATION: 98 % | WEIGHT: 160 LBS | DIASTOLIC BLOOD PRESSURE: 65 MMHG

## 2022-07-20 PROCEDURE — 0DBK8ZX EXCISION OF ASCENDING COLON, VIA NATURAL OR ARTIFICIAL OPENING ENDOSCOPIC, DIAGNOSTIC: ICD-10-PCS | Performed by: INTERNAL MEDICINE

## 2022-07-20 PROCEDURE — 88305 TISSUE EXAM BY PATHOLOGIST: CPT | Performed by: INTERNAL MEDICINE

## 2022-07-20 RX ORDER — LIDOCAINE HYDROCHLORIDE 20 MG/ML
INJECTION, SOLUTION EPIDURAL; INFILTRATION; INTRACAUDAL; PERINEURAL AS NEEDED
Status: DISCONTINUED | OUTPATIENT
Start: 2022-07-20 | End: 2022-07-20 | Stop reason: SURG

## 2022-07-20 RX ORDER — NALOXONE HYDROCHLORIDE 0.4 MG/ML
80 INJECTION, SOLUTION INTRAMUSCULAR; INTRAVENOUS; SUBCUTANEOUS AS NEEDED
Status: DISCONTINUED | OUTPATIENT
Start: 2022-07-20 | End: 2022-07-20

## 2022-07-20 RX ORDER — SODIUM CHLORIDE, SODIUM LACTATE, POTASSIUM CHLORIDE, CALCIUM CHLORIDE 600; 310; 30; 20 MG/100ML; MG/100ML; MG/100ML; MG/100ML
INJECTION, SOLUTION INTRAVENOUS CONTINUOUS
Status: DISCONTINUED | OUTPATIENT
Start: 2022-07-20 | End: 2022-07-20

## 2022-07-20 RX ORDER — ONDANSETRON 2 MG/ML
4 INJECTION INTRAMUSCULAR; INTRAVENOUS AS NEEDED
Status: DISCONTINUED | OUTPATIENT
Start: 2022-07-20 | End: 2022-07-20

## 2022-07-20 RX ADMIN — LIDOCAINE HYDROCHLORIDE 40 MG: 20 INJECTION, SOLUTION EPIDURAL; INFILTRATION; INTRACAUDAL; PERINEURAL at 12:27:00

## 2022-07-20 RX ADMIN — SODIUM CHLORIDE, SODIUM LACTATE, POTASSIUM CHLORIDE, CALCIUM CHLORIDE: 600; 310; 30; 20 INJECTION, SOLUTION INTRAVENOUS at 12:50:00

## 2022-07-20 NOTE — ANESTHESIA POSTPROCEDURE EVALUATION
[address] Patient Status:  Hospital Outpatient Surgery   Age/Gender 81year old male MRN [id number]   Location [address] [address] Attending Mihir Bal MD   Hosp Day # 0 PCP Jonathon Cordoba MD       Anesthesia Post-op Note    COLONOSCOPY with forcep polypectomy and endoscopic mucosal resection and clip placement x1    Procedure Summary     Date: 07/20/22 Room / Location: [facility] ENDOSCOPY 03 / [facility] ENDOSCOPY    Anesthesia Start: 8968 Anesthesia Stop: 1250    Procedure: COLONOSCOPY with forcep polypectomy and endoscopic mucosal resection and clip placement x1 (N/A ) Diagnosis: (diverticulosis, polyps)    Surgeons: Mihir Bal MD Anesthesiologist: Kt Zarco MD    Anesthesia Type: MAC ASA Status: 3          Anesthesia Type: MAC    Vitals Value Taken Time   /61 07/20/22 1251   Temp  07/20/22 1251   Pulse 55 07/20/22 1251   Resp 18 07/20/22 1251   SpO2 99 % 07/20/22 1251       Patient Location: Endoscopy    Anesthesia Type: MAC    Airway Patency: patent    Postop Pain Control: adequate    Mental Status: mildly sedated but able to meaningfully participate in the post-anesthesia evaluation    Nausea/Vomiting: none    Cardiopulmonary/Hydration status: stable euvolemic    Complications: no apparent anesthesia related complications    Postop vital signs: stable    Dental Exam: Unchanged from Preop    Patient to be discharged home when criteria met.

## 2022-07-20 NOTE — OPERATIVE REPORT
OPERATIVE REPORT   PATIENT NAME: Corbin Herrera  MRN: [id number]  DATE OF OPERATION: 7/20/2022  PREOPERATIVE DIAGNOSIS: rectal bleeding, colitis on CT scan  POSTOPERATIVE DIAGNOSES   1. Moderate sigmoid and DC diverticulosis  2. 1cm sessile AC polyp removed with EMR  3. 4 mm sessile AC polyp  PROCEDURE PERFORMED: Colonoscopy to cecum  SCOPE UTILIZED: Adult Olympus Colonoscope  SEDATION MEDICATIONS: MAC   CECAL WITHDRAWAL TIME= 10 mins  DURATION of CONSCIOUS SEDATION: deep sedation provided by anesthesiologist  PREPROCEDURE ASSESSMENT: The indication for this procedure is to assess for polyps. The patient was identified by myself and nursing staff in the exam room. Informed consent was obtained. The patient was seen in clinic and a full H&P was obtained. On brief physical examination, airway is patent. Chest is clear. Heart has regular rate and rhythm. Abdomen is soft, nontender with good bowel sounds. A medication list was taken by nursing today and reviewed by myself. The patient is an ASA grade 2. Due to the technical nature of the procedure, pathology of the anal area could be missed. PROCEDURE NOTE: The procedure was completed without difficulty. The patient tolerated the procedure well. The prep was good. The colonoscope was inserted through the anus and advanced to the level of the cecum with visualization and photo documentation of the appendix. A slow withdrawal of the colonoscope was performed as well as retroflexion in the rectum. A 1 cm sessile polyp in the [facility] [place] operated by [facility] was raised with submucosal injection of saline and methylene blue using a sclerotherapy needle. Once raised, the edges were much better delineated. It was removed with hot snare. A diminutive polyp was removed with cold forceps. The edges of the polypectomy site were fulgurated to prevent local recurrence using soft coag and snare tip. 1 Clip was placed to prevent bleeding. No other polyps, masses or lesions were found throughout the colon. Diverticulosis was noted. Small internal hemorrhoids were noted. There were no immediate complications. FINDINGS   1. Polyps x 2   2. diverticulosis  RECOMMENDATIONS:   DISCHARGE: The patient was given an after visit summary detailing the procedure, findings, recommendations, f/u plan and an updated medication list.   PREP Quality indicators:  Aronchick scale    EXCELLENT - small volume of clear liquid > 95% of mucosa see  GOOD  - clear liquid covering up to 25% of mucosa, but > 90% of mucosa seen  FAIR  - some semisolid stool could be suctioned but > 90% of mucosa seen  POOR  - semisolid stool could not be suctioned and < 90% of mucosal seen  INADEQUATE- repeat preparation needed      Thank you very much for the consultation. I really appreciate it.     Jose Reece MD

## 2025-03-20 ENCOUNTER — HOSPITAL ENCOUNTER (OUTPATIENT)
Facility: HOSPITAL | Age: 89
Discharge: HOME | End: 2025-03-21
Attending: UROLOGY | Admitting: UROLOGY
Payer: MEDICARE

## 2025-03-20 ENCOUNTER — APPOINTMENT (OUTPATIENT)
Dept: GENERAL RADIOLOGY | Facility: HOSPITAL | Age: 89
End: 2025-03-20
Attending: UROLOGY
Payer: MEDICARE

## 2025-03-20 ENCOUNTER — ANESTHESIA (OUTPATIENT)
Dept: SURGERY | Facility: HOSPITAL | Age: 89
End: 2025-03-20
Payer: MEDICARE

## 2025-03-20 ENCOUNTER — ANESTHESIA EVENT (OUTPATIENT)
Dept: SURGERY | Facility: HOSPITAL | Age: 89
End: 2025-03-20
Payer: MEDICARE

## 2025-03-20 PROBLEM — R31.0 GROSS HEMATURIA: Status: ACTIVE | Noted: 2025-03-20

## 2025-03-20 LAB
CREAT BLD-MCNC: 1.62 MG/DL
EGFRCR SERPLBLD CKD-EPI 2021: 41 ML/MIN/1.73M2 (ref 60–?)

## 2025-03-20 PROCEDURE — 0T778DZ DILATION OF LEFT URETER WITH INTRALUMINAL DEVICE, VIA NATURAL OR ARTIFICIAL OPENING ENDOSCOPIC: ICD-10-PCS | Performed by: UROLOGY

## 2025-03-20 PROCEDURE — 88342 IMHCHEM/IMCYTCHM 1ST ANTB: CPT | Performed by: UROLOGY

## 2025-03-20 PROCEDURE — 88307 TISSUE EXAM BY PATHOLOGIST: CPT | Performed by: UROLOGY

## 2025-03-20 PROCEDURE — 88341 IMHCHEM/IMCYTCHM EA ADD ANTB: CPT | Performed by: UROLOGY

## 2025-03-20 PROCEDURE — 0TBB8ZX EXCISION OF BLADDER, VIA NATURAL OR ARTIFICIAL OPENING ENDOSCOPIC, DIAGNOSTIC: ICD-10-PCS | Performed by: UROLOGY

## 2025-03-20 PROCEDURE — 88313 SPECIAL STAINS GROUP 2: CPT | Performed by: UROLOGY

## 2025-03-20 PROCEDURE — 82565 ASSAY OF CREATININE: CPT | Performed by: INTERNAL MEDICINE

## 2025-03-20 DEVICE — URETERAL STENT
Type: IMPLANTABLE DEVICE | Site: URETER | Status: FUNCTIONAL
Brand: ASCERTA™

## 2025-03-20 RX ORDER — HYDRALAZINE HYDROCHLORIDE 20 MG/ML
INJECTION INTRAMUSCULAR; INTRAVENOUS
Status: COMPLETED
Start: 2025-03-20 | End: 2025-03-20

## 2025-03-20 RX ORDER — HYDROMORPHONE HYDROCHLORIDE 1 MG/ML
0.2 INJECTION, SOLUTION INTRAMUSCULAR; INTRAVENOUS; SUBCUTANEOUS EVERY 5 MIN PRN
Status: DISCONTINUED | OUTPATIENT
Start: 2025-03-20 | End: 2025-03-20 | Stop reason: HOSPADM

## 2025-03-20 RX ORDER — PHENYLEPHRINE HCL 10 MG/ML
VIAL (ML) INJECTION AS NEEDED
Status: DISCONTINUED | OUTPATIENT
Start: 2025-03-20 | End: 2025-03-20 | Stop reason: SURG

## 2025-03-20 RX ORDER — MAGNESIUM HYDROXIDE 1200 MG/15ML
3000 LIQUID ORAL CONTINUOUS
Status: DISCONTINUED | OUTPATIENT
Start: 2025-03-20 | End: 2025-03-21

## 2025-03-20 RX ORDER — NALOXONE HYDROCHLORIDE 0.4 MG/ML
80 INJECTION, SOLUTION INTRAMUSCULAR; INTRAVENOUS; SUBCUTANEOUS AS NEEDED
Status: DISCONTINUED | OUTPATIENT
Start: 2025-03-20 | End: 2025-03-20 | Stop reason: HOSPADM

## 2025-03-20 RX ORDER — ACETAMINOPHEN 500 MG
1000 TABLET ORAL ONCE AS NEEDED
Status: DISCONTINUED | OUTPATIENT
Start: 2025-03-20 | End: 2025-03-20 | Stop reason: HOSPADM

## 2025-03-20 RX ORDER — SODIUM CHLORIDE, SODIUM LACTATE, POTASSIUM CHLORIDE, CALCIUM CHLORIDE 600; 310; 30; 20 MG/100ML; MG/100ML; MG/100ML; MG/100ML
INJECTION, SOLUTION INTRAVENOUS CONTINUOUS
Status: DISCONTINUED | OUTPATIENT
Start: 2025-03-20 | End: 2025-03-20 | Stop reason: HOSPADM

## 2025-03-20 RX ORDER — LIDOCAINE HYDROCHLORIDE 10 MG/ML
INJECTION, SOLUTION EPIDURAL; INFILTRATION; INTRACAUDAL; PERINEURAL AS NEEDED
Status: DISCONTINUED | OUTPATIENT
Start: 2025-03-20 | End: 2025-03-20 | Stop reason: SURG

## 2025-03-20 RX ORDER — TAMSULOSIN HYDROCHLORIDE 0.4 MG/1
0.4 CAPSULE ORAL
Status: DISCONTINUED | OUTPATIENT
Start: 2025-03-20 | End: 2025-03-21

## 2025-03-20 RX ORDER — HYDROCODONE BITARTRATE AND ACETAMINOPHEN 5; 325 MG/1; MG/1
1 TABLET ORAL ONCE AS NEEDED
Status: DISCONTINUED | OUTPATIENT
Start: 2025-03-20 | End: 2025-03-20 | Stop reason: HOSPADM

## 2025-03-20 RX ORDER — HYDROMORPHONE HYDROCHLORIDE 1 MG/ML
INJECTION, SOLUTION INTRAMUSCULAR; INTRAVENOUS; SUBCUTANEOUS
Status: COMPLETED
Start: 2025-03-20 | End: 2025-03-20

## 2025-03-20 RX ORDER — DEXAMETHASONE SODIUM PHOSPHATE 4 MG/ML
VIAL (ML) INJECTION AS NEEDED
Status: DISCONTINUED | OUTPATIENT
Start: 2025-03-20 | End: 2025-03-20 | Stop reason: SURG

## 2025-03-20 RX ORDER — GLYCOPYRROLATE 0.2 MG/ML
INJECTION, SOLUTION INTRAMUSCULAR; INTRAVENOUS AS NEEDED
Status: DISCONTINUED | OUTPATIENT
Start: 2025-03-20 | End: 2025-03-20 | Stop reason: SURG

## 2025-03-20 RX ORDER — ONDANSETRON 2 MG/ML
INJECTION INTRAMUSCULAR; INTRAVENOUS AS NEEDED
Status: DISCONTINUED | OUTPATIENT
Start: 2025-03-20 | End: 2025-03-20 | Stop reason: SURG

## 2025-03-20 RX ORDER — ONDANSETRON 2 MG/ML
4 INJECTION INTRAMUSCULAR; INTRAVENOUS EVERY 6 HOURS PRN
Status: DISCONTINUED | OUTPATIENT
Start: 2025-03-20 | End: 2025-03-21

## 2025-03-20 RX ORDER — HYDROCODONE BITARTRATE AND ACETAMINOPHEN 5; 325 MG/1; MG/1
2 TABLET ORAL ONCE AS NEEDED
Status: DISCONTINUED | OUTPATIENT
Start: 2025-03-20 | End: 2025-03-20 | Stop reason: HOSPADM

## 2025-03-20 RX ORDER — AMLODIPINE BESYLATE 5 MG/1
5 TABLET ORAL DAILY
COMMUNITY

## 2025-03-20 RX ORDER — HYDRALAZINE HYDROCHLORIDE 20 MG/ML
5 INJECTION INTRAMUSCULAR; INTRAVENOUS EVERY 10 MIN PRN
Status: DISCONTINUED | OUTPATIENT
Start: 2025-03-20 | End: 2025-03-20 | Stop reason: HOSPADM

## 2025-03-20 RX ORDER — LABETALOL HYDROCHLORIDE 5 MG/ML
INJECTION, SOLUTION INTRAVENOUS
Status: COMPLETED
Start: 2025-03-20 | End: 2025-03-20

## 2025-03-20 RX ORDER — ACETAMINOPHEN 325 MG/1
650 TABLET ORAL EVERY 6 HOURS PRN
Status: DISCONTINUED | OUTPATIENT
Start: 2025-03-20 | End: 2025-03-21

## 2025-03-20 RX ORDER — DIPHENHYDRAMINE HYDROCHLORIDE 50 MG/ML
12.5 INJECTION, SOLUTION INTRAMUSCULAR; INTRAVENOUS AS NEEDED
Status: DISCONTINUED | OUTPATIENT
Start: 2025-03-20 | End: 2025-03-20 | Stop reason: HOSPADM

## 2025-03-20 RX ORDER — LIDOCAINE HYDROCHLORIDE 20 MG/ML
JELLY TOPICAL AS NEEDED
Status: DISCONTINUED | OUTPATIENT
Start: 2025-03-20 | End: 2025-03-20 | Stop reason: HOSPADM

## 2025-03-20 RX ORDER — METOCLOPRAMIDE HYDROCHLORIDE 5 MG/ML
10 INJECTION INTRAMUSCULAR; INTRAVENOUS EVERY 8 HOURS PRN
Status: DISCONTINUED | OUTPATIENT
Start: 2025-03-20 | End: 2025-03-20 | Stop reason: HOSPADM

## 2025-03-20 RX ORDER — DOCUSATE SODIUM 100 MG/1
100 CAPSULE, LIQUID FILLED ORAL DAILY
Status: DISCONTINUED | OUTPATIENT
Start: 2025-03-21 | End: 2025-03-21

## 2025-03-20 RX ORDER — HYDROMORPHONE HYDROCHLORIDE 1 MG/ML
0.6 INJECTION, SOLUTION INTRAMUSCULAR; INTRAVENOUS; SUBCUTANEOUS EVERY 5 MIN PRN
Status: DISCONTINUED | OUTPATIENT
Start: 2025-03-20 | End: 2025-03-20 | Stop reason: HOSPADM

## 2025-03-20 RX ORDER — LABETALOL HYDROCHLORIDE 5 MG/ML
5 INJECTION, SOLUTION INTRAVENOUS EVERY 5 MIN PRN
Status: DISCONTINUED | OUTPATIENT
Start: 2025-03-20 | End: 2025-03-20 | Stop reason: HOSPADM

## 2025-03-20 RX ORDER — SODIUM CHLORIDE 9 MG/ML
INJECTION, SOLUTION INTRAVENOUS CONTINUOUS
Status: DISCONTINUED | OUTPATIENT
Start: 2025-03-20 | End: 2025-03-21

## 2025-03-20 RX ORDER — ONDANSETRON 2 MG/ML
4 INJECTION INTRAMUSCULAR; INTRAVENOUS EVERY 6 HOURS PRN
Status: DISCONTINUED | OUTPATIENT
Start: 2025-03-20 | End: 2025-03-20 | Stop reason: HOSPADM

## 2025-03-20 RX ORDER — IOPAMIDOL 612 MG/ML
INJECTION, SOLUTION INTRAVASCULAR AS NEEDED
Status: DISCONTINUED | OUTPATIENT
Start: 2025-03-20 | End: 2025-03-20 | Stop reason: HOSPADM

## 2025-03-20 RX ORDER — SODIUM CHLORIDE, SODIUM LACTATE, POTASSIUM CHLORIDE, CALCIUM CHLORIDE 600; 310; 30; 20 MG/100ML; MG/100ML; MG/100ML; MG/100ML
INJECTION, SOLUTION INTRAVENOUS CONTINUOUS
Status: DISCONTINUED | OUTPATIENT
Start: 2025-03-20 | End: 2025-03-20

## 2025-03-20 RX ORDER — HYDROMORPHONE HYDROCHLORIDE 1 MG/ML
0.4 INJECTION, SOLUTION INTRAMUSCULAR; INTRAVENOUS; SUBCUTANEOUS EVERY 5 MIN PRN
Status: DISCONTINUED | OUTPATIENT
Start: 2025-03-20 | End: 2025-03-20 | Stop reason: HOSPADM

## 2025-03-20 RX ORDER — AMLODIPINE BESYLATE 5 MG/1
5 TABLET ORAL DAILY
Status: DISCONTINUED | OUTPATIENT
Start: 2025-03-21 | End: 2025-03-21

## 2025-03-20 RX ORDER — ENOXAPARIN SODIUM 100 MG/ML
40 INJECTION SUBCUTANEOUS NIGHTLY
Status: DISCONTINUED | OUTPATIENT
Start: 2025-03-20 | End: 2025-03-21

## 2025-03-20 RX ORDER — KETOROLAC TROMETHAMINE 15 MG/ML
15 INJECTION, SOLUTION INTRAMUSCULAR; INTRAVENOUS EVERY 6 HOURS PRN
Status: DISCONTINUED | OUTPATIENT
Start: 2025-03-20 | End: 2025-03-21

## 2025-03-20 RX ORDER — MEPERIDINE HYDROCHLORIDE 25 MG/ML
12.5 INJECTION INTRAMUSCULAR; INTRAVENOUS; SUBCUTANEOUS AS NEEDED
Status: DISCONTINUED | OUTPATIENT
Start: 2025-03-20 | End: 2025-03-20 | Stop reason: HOSPADM

## 2025-03-20 RX ADMIN — GLYCOPYRROLATE 0.2 MG: 0.2 INJECTION, SOLUTION INTRAMUSCULAR; INTRAVENOUS at 07:33:00

## 2025-03-20 RX ADMIN — ONDANSETRON 4 MG: 2 INJECTION INTRAMUSCULAR; INTRAVENOUS at 07:12:00

## 2025-03-20 RX ADMIN — PHENYLEPHRINE HCL 100 MCG: 10 MG/ML VIAL (ML) INJECTION at 07:21:00

## 2025-03-20 RX ADMIN — LIDOCAINE HYDROCHLORIDE 75 MG: 10 INJECTION, SOLUTION EPIDURAL; INFILTRATION; INTRACAUDAL; PERINEURAL at 07:08:00

## 2025-03-20 RX ADMIN — PHENYLEPHRINE HCL 75 MCG: 10 MG/ML VIAL (ML) INJECTION at 07:19:00

## 2025-03-20 RX ADMIN — SODIUM CHLORIDE, SODIUM LACTATE, POTASSIUM CHLORIDE, CALCIUM CHLORIDE: 600; 310; 30; 20 INJECTION, SOLUTION INTRAVENOUS at 07:02:00

## 2025-03-20 RX ADMIN — PHENYLEPHRINE HCL 75 MCG: 10 MG/ML VIAL (ML) INJECTION at 07:33:00

## 2025-03-20 RX ADMIN — DEXAMETHASONE SODIUM PHOSPHATE 4 MG: 4 MG/ML VIAL (ML) INJECTION at 07:20:00

## 2025-03-20 NOTE — PLAN OF CARE
Alert and oriented x4. Hard of hearing, no hearing aids present at bedside.    Afebrile, elevated blood pressure at evening check, denies lightheadedness, chest pain, dyspnea, tolerating room air.  IV fluids per order.  Tolerating minimal regular diet due to limited jaw movement from pain    CBI running moderately to fast, scant clots but otherwise cherry colored. Patient denies any bladder spasms, urethral pain.    Plan to continue CBI titration overnight.

## 2025-03-20 NOTE — ANESTHESIA PROCEDURE NOTES
Airway  Date/Time: 3/20/2025 7:08 AM  Urgency: elective    Airway not difficult    General Information and Staff    Patient location during procedure: OR  Anesthesiologist: Aubrey Khan MD  Performed: anesthesiologist   Performed by: Aubrey Khan MD  Authorized by: Aubrey Khan MD      Indications and Patient Condition  Indications for airway management: anesthesia  Spontaneous Ventilation: absent  Sedation level: deep  Preoxygenated: yes  Patient position: sniffing  Mask difficulty assessment: 1 - vent by mask    Final Airway Details  Final airway type: supraglottic airway      Successful airway: classic  Size 4       Number of attempts at approach: 1  Number of other approaches attempted: 0

## 2025-03-20 NOTE — PROGRESS NOTES
NURSING ADMISSION NOTE      Patient admitted via Cart from PACU  Oriented to room.  Safety precautions initiated.  Bed in low position.  Call light in reach.    Aox4  Severe pain to right jaw, exacerbated when opening  No urethral pain, no pelvic pain, denies bladder spasms  CBI running moderately, no clots, no blood    Spouse at bedside     Hospitalist paged regarding consult, jaw pain, POLST clarification      Skin check performed with nursing student, sacrum CDI. Right heel blanchable redness. Van draining clear yellow urine.

## 2025-03-20 NOTE — ANESTHESIA PREPROCEDURE EVALUATION
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  Temp: 97.9 °F (36.6 °C)  Pulse: 55  Resp: 18  BP: 144/81  SpO2: 95 %  Body mass index is 25.24 kg/m².    Current medications reviewed.  Hospital Medications:   lactated ringers infusion   Intravenous Continuous    [COMPLETED] ceFAZolin (Ancef) 2g in 10mL IV syringe premix  2 g Intravenous Once       Outpatient Medications:   Prescriptions Prior to Admission[1]    Allergies: Pcns [penicillins] and Norco [hydrocodone-acetaminophen]      Anesthesia Evaluation    Patient summary reviewed.    Anesthetic Complications  (-) history of anesthetic complications         GI/Hepatic/Renal      (+) GERD and well controlled      (+) chronic renal disease and CRI                   Cardiovascular      ECG reviewed.  Exercise tolerance: good     MET: >4      (+) hypertension                     (-) CHF  (-) angina     (-) COUGHLIN  (-) orthopnea  (-) PND     Endo/Other    Negative endo/other ROS.  (-) diabetes         (-) anemia                   Pulmonary    Negative pulmonary ROS.           (-) shortness of breath  (-) recent URI          Neuro/Psych  Comment: Slight dementia                                    Past Surgical History:   Procedure Laterality Date    Appendectomy      Colonoscopy N/A 7/20/2022    Procedure: COLONOSCOPY with forcep polypectomy and endoscopic mucosal resection and clip placement x1;  Surgeon: Jayce Rose MD;  [facility]: [facility]    Laparoscopic repair of initial Right 12/4/2015     Procedure: LAPAROSCOPIC INGUINAL HERNIORRAPHY;  Surgeon: Lan Jefferson MD;  [facility] [place]    Other surgical history  3/19/2013    Cystoscopy - Dr. Smith     Other surgical history  13    Flow US - Dr. Smith    Other surgical history  16    Cysto, TURP - Dr. Thornton     Social History     Socioeconomic History    Marital status:    Tobacco Use    Smoking status: Former     Current packs/day: 0.00     Types: Cigarettes     Quit date: 2015     Years since quittin.2    Smokeless tobacco: Former    Tobacco comments:     chewed for 40 yrs   Vaping Use    Vaping status: Never Used   Substance and Sexual Activity    Alcohol use: Yes     Comment: occasionally/2 beers    Drug use: No     History   Drug Use No     Available pre-op labs reviewed.               Airway      Mallampati: I  Mouth opening: 3 FB  TM distance: 4 - 6 cm  Neck ROM: full Cardiovascular    Cardiovascular exam normal.  Rhythm: regular  Rate: normal  (-) murmur   Dental      Dental appliance(s): upper dentures and lower dentures       Pulmonary    Pulmonary exam normal.  Breath sounds clear to auscultation bilaterally.          (-) rales     Other findings              ECG rhythm:   Normal sinus    ----------------------------------------------------------------------------    Conclusions:    1. Left ventricle: The cavity size was normal. Wall thickness was normal.     Systolic function was normal. The estimated ejection fraction was 60-65%.     Doppler parameters are consistent with abnormal left ventricular     relaxation - grade 1 diastolic dysfunction.  2. Aorta: Aortic root was 4cm at the sinus of Valsalva.  3. Mitral valve: Mildly calcified annulus. There was trivial regurgitation.  4. Left atrium: The left atrial volume was normal.  5. Right ventricle: The cavity size was normal. Systolic function was     normal.    Impressions:  This study is compared with previous dated  6/28/2019:    ----------------------------------------------------------------------------    Prepared and electronically signed by  Sushant Etienne MD.  12/13/2021 07:38         Exam Ended: 12/12/21 11:14       ASA: 2   Plan: general  NPO status verified and patient meets guidelines.  Patient has not taken beta blockers in last 24 hours.        Plan/risks discussed with: patient and spouse              We discussed GA w/LMA or ETT and possible scratchy throat and rarely dental damage.  We discussed analgesic plan and PONV prophylaxis.  The patient's questions were answered and consent was attained.            [1]   Medications Prior to Admission   Medication Sig Dispense Refill Last Dose/Taking    amLODIPine 5 MG Oral Tab Take 1 tablet (5 mg total) by mouth daily.   3/19/2025 at  7:00 PM    GLUCOSAMINE-CHONDROITIN OR Take 1 tablet by mouth daily.   3/13/2025    ALFUZOSIN 10 MG Oral Tablet 24 Hr TAKE 1 TABLET BY MOUTH EVERY DAY 90 tablet 0 3/18/2025

## 2025-03-20 NOTE — PROGRESS NOTES
03/20/25 1840   Bladder Irrigation   Irrigant Normal saline   Flow Moderate   CBI Irrigation Intake (mL) 49559 mL   CBI Van Output (mL) 12540 mL   CBI Net Output (mL) -500 mL   Van Urine Color Cherry   Van Urine Clarity Bloody   How tolerated? Tolerated well

## 2025-03-20 NOTE — CONSULTS
[facility]  CONSULT     Cheng Fuentes Patient Status:  Outpatient in a Bed    9/10/1935 MRN [id number]   Location [facility] 3NW-A Attending Adi Murphy MD   Hosp Day # 0 PCP Lan Wen MD     Reason for consult:   Medical co management    Requested by:   Dr Murphy    History of Present Illness: Cheng Fuentes is a 89 year old male with PMH Sig thickened for BPH, dementia with mild cognitive impairment, HTN, DL, GERD, CKD 3, presents following transurethral resection of bladder tumor.  Patient had a CT and cystoscopy for evaluation of hematuria noted to have bladder mass.  Tolerated procedure without any apparent complications except currently having right jaw pain, unable to open mouth, painful movement on right side, no swelling or tenderness.  Patient says prior to procedure he felt completely fine.  No dizziness or lightheadedness.  No nausea vomiting or abdominal pain.  No chest pain or shortness of breath.  Wife at bedside.  We are being asked to see the patient in consultation for medical comanagement.    Past Medical History:  Past Medical History:    BPH (benign prostatic hyperplasia)    Colitis    Dementia (HCC)    Elevated PSA    Esophageal reflux    Gross hematuria    Hearing impairment    has bilateral hearing aids; wears mostlty just for the right ear    Hearing loss    High blood pressure    High cholesterol    Denies    MCI (mild cognitive impairment)    MMSE 29/20 on 2013    Osteoarthritis    Renal disorder    Acute renal failure with stents 2016    Syncope and collapse    Unspecified essential hypertension    Visual impairment    Glasses        Past Surgical History:   Past Surgical History:   Procedure Laterality Date    Appendectomy      Colonoscopy N/A 2022    Procedure: COLONOSCOPY with forcep polypectomy and endoscopic mucosal resection and clip placement x1;  Surgeon: Jayce Rose MD;  Location:  ENDOSCOPY    Cystourethroscopy  2013     Cystoscopy - Dr. Smith     Cystourethroscopy,fulgur 2-5cm lesn  03/20/2025    TURBT with left ureter stent placement    Laparoscopic repair of initial Right 12/04/2015    Procedure: LAPAROSCOPIC INGUINAL HERNIORRAPHY;  Surgeon: Lan Jefferson MD;  Location: [facility]GICAL [facility]    Other surgical history  06/25/2013    Flow US - Dr. Smith    Transurethral elec-surg prostatectom  01/29/2016    Cysto, TURP - Dr. Thornton       Social History:  reports that he quit smoking about 9 years ago. His smoking use included cigarettes. He has quit using smokeless tobacco. He reports current alcohol use. He reports that he does not use drugs.    Family History:   Family History   Problem Relation Age of Onset    Heart Disorder Father     Heart Disorder Mother         Allergies: Allergies[1]    Medications:  Medications Ordered Prior to Encounter[2]    Review of Systems:   A comprehensive 14 point review of systems was completed.    Pertinent positives and negatives noted in the HPI.    Physical Exam:    /72 (BP Location: Right arm)   Pulse 59   Temp 98.5 °F (36.9 °C) (Temporal)   Resp 18   Ht 5' 8\" (1.727 m)   Wt 166 lb (75.3 kg)   SpO2 95%   BMI 25.24 kg/m²   General: No acute distress. Alert and oriented x 3.  HEENT: Normocephalic atraumatic. Moist mucous membranes. EOM-I. PERRLA. Anicteric.  Neck: No lymphadenopathy. No JVD. No carotid bruits.  Respiratory: Clear to auscultation bilaterally. No wheezes. No rhonchi.  Cardiovascular: S1, S2. Regular rate and rhythm. No murmurs, rubs or gallops. Equal pulses.   Chest and Back: No tenderness or deformity.  Abdomen: Soft, nontender, nondistended.  Positive bowel sounds. No rebound, guarding or organomegaly.  Neurologic: No focal neurological deficits. CNII-XII grossly intact.  Musculoskeletal: Moves all extremities.  Extremities: No edema or cyanosis.  Integument: No rashes or lesions.   Psychiatric: Appropriate mood and affect.      Diagnostic Data:       Labs:  No results for input(s): \"WBC\", \"HGB\", \"MCV\", \"PLT\", \"BAND\", \"INR\" in the last 168 hours.    Invalid input(s): \"LYM#\", \"MONO#\", \"BASOS#\", \"EOSIN#\"    No results for input(s): \"GLU\", \"BUN\", \"CREATSERUM\", \"GFRAA\", \"GFRNAA\", \"CA\", \"ALB\", \"NA\", \"K\", \"CL\", \"CO2\", \"ALKPHO\", \"AST\", \"ALT\", \"BILT\", \"TP\" in the last 168 hours.    No results for input(s): \"PTP\", \"INR\" in the last 168 hours.    COVID-19 Lab Results    COVID-19  Lab Results   Component Value Date    COVID19 Not Detected 03/17/2022    COVID19 Not Detected 12/11/2021       Pro-Calcitonin  No results for input(s): \"PCT\" in the last 168 hours.    Cardiac  No results for input(s): \"TROP\", \"PBNP\" in the last 168 hours.    Creatinine Kinase  No results for input(s): \"CK\" in the last 168 hours.    Inflammatory Markers  No results for input(s): \"CRP\", \"DONAVON\", \"LDH\", \"DDIMER\" in the last 168 hours.    Imaging: Imaging data reviewed in Epic.      ASSESSMENT / PLAN:   Cheng Fuentes is a 89 year old male with PMH Sig thickened for BPH, dementia with mild cognitive impairment, HTN, DL, GERD, CKD 3, presents following transurethral resection of bladder tumor.      Bladder tumor s/p TURBT 3/20/25  BPH  Post op pain  Main management per Urology  service, including pain control, wound care, DVT prophylaxis, and disposition  Encourage early ambulation  Encourage I-S use  PT/OT    Right jaw pain  -xray TMJ  -unclear etiology  -possible injury during intubation?    HTN  DL  -resume home med: almodipine    GERD  -PPI    CKD III  -monitor labs  Dementia w/ MCI  -resume home meds if any        Thank you for allowing me to participate in the care of this patient.  I will be following the patient while she is in the hospital.        Mago Fitzgeraldy Hospitalist  Pager [phone]  Answering Service number: [phone]              [1]   Allergies  Allergen Reactions    Pcns [Penicillins] HIVES    Norco [Hydrocodone-Acetaminophen] NAUSEA AND VOMITING     Pt had severe  reaction/vomiting with NORCO   [2]   No current facility-administered medications on file prior to encounter.     Current Outpatient Medications on File Prior to Encounter   Medication Sig Dispense Refill    amLODIPine 5 MG Oral Tab Take 1 tablet (5 mg total) by mouth daily.      GLUCOSAMINE-CHONDROITIN OR Take 1 tablet by mouth daily.      ALFUZOSIN 10 MG Oral Tablet 24 Hr TAKE 1 TABLET BY MOUTH EVERY DAY 90 tablet 0    [DISCONTINUED] amLODIPine 10 MG Oral Tab Take 1 tablet (10 mg total) by mouth daily. 90 tablet 3

## 2025-03-20 NOTE — H&P
History and Physical     Cheng ISABEL Fuentes Patient Status:  Outpatient in a Bed    9/10/1935 MRN [id number]   Location [facility] PERIOPERATIVE SERVICE Attending Adi Murphy MD   Hosp Day # 0 PCP Lan Wen MD     Chief Complaint: bladder mass    Subjective:    Cheng Fuentes is a 89 year old male with hematuria noted on CT and cysto to have a bladder mass.  Reviewed risk of bladder cancer  History of TURP for retention and LUTS  Known enlarged prostate  Here with wife  Reviewed poss admission overnight with CBI  Reviewed dc home with short   Saw cardiology and PCP in preparation for surgery. Notes in the chart.    History/Other:      Past Medical History:  Past Medical History:    BPH (benign prostatic hyperplasia)    Colitis    Dementia (HCC)    Elevated PSA    Esophageal reflux    Gross hematuria    Hearing impairment    has bilateral hearing aids; wears mostlty just for the right ear    Hearing loss    High blood pressure    High cholesterol    Denies    MCI (mild cognitive impairment)    MMSE 29/20 on 2013    Osteoarthritis    Renal disorder    Acute renal failure with stents 2016    Syncope and collapse    Unspecified essential hypertension    Visual impairment    Glasses        Past Surgical History:   Past Surgical History:   Procedure Laterality Date    Appendectomy      Colonoscopy N/A 2022    Procedure: COLONOSCOPY with forcep polypectomy and endoscopic mucosal resection and clip placement x1;  Surgeon: Jayce Rose MD;  Location: [facility]    Laparoscopic repair of initial Right 2015    Procedure: LAPAROSCOPIC INGUINAL HERNIORRAPHY;  Surgeon: Lan Jefferson MD;  Location: [facility]    Other surgical history  3/19/2013    Cystoscopy - Dr. Smith     Other surgical history  13    Flow US - Dr. Smith    Other surgical history  16    Cysto, TURP - Dr. Thornton       Social History:  reports that he quit smoking about 9 years ago. His  smoking use included cigarettes. He has quit using smokeless tobacco. He reports current alcohol use. He reports that he does not use drugs.    Family History:   Family History   Problem Relation Age of Onset    Heart Disorder Father     Heart Disorder Mother        Allergies: Allergies[1]    Medications:  Medications Ordered Prior to Encounter[2]    Review of Systems:   A comprehensive 14 point review of systems was completed.    Pertinent positives and negatives noted in the HPI.    Objective:     /81 (BP Location: Left arm)   Pulse 55   Temp 97.9 °F (36.6 °C) (Oral)   Resp 18   Ht 5' 8\" (1.727 m)   Wt 166 lb (75.3 kg)   SpO2 95%   BMI 25.24 kg/m²   General: No acute distress.  Alert ,         Respiratory: No wheezes.   Cardiovascular:  Regular   Chest and Back: No tenderness or deformity.  Abdomen: Soft, nontender, nondistended.      Results:    Labs: 3/14/25  Blood Urea Nitrogen  6.0 - 20.0 mg/dL 24.0 High     Creatinine  0.70 - 1.20 mg/dL 1.46 High       AST  0 - 40 U/L 15    ALT  0 - 41 U/L 10    GFR CKD-EPI NO RACE  >=60.0 mL/min/1.73 square meters 46.0 Low       WBC  4.00 - 13.00 10^3/uL 5.78   RBC  3.80 - 5.80 10^6/uL 5.11   Hemoglobin  13.0 - 17.0 g/dL 15.5     Platelet Count  150 - 450 10^3/uL 170       Imaging: Imaging data reviewed in Epic.  1/22/25  IMPRESSION:   * Multifocal eccentric wall thickening of the urinary bladder, concerning for underlying   malignancy, with mild bilateral hydroureteronephrosis.     *  Punctate nonobstructive right interpolar renal calculus.     *  Bosniak I and II renal cysts. No solid renal lesions.     *  Marked prostatomegaly.     Assessment & Plan:    #bladder mass, reviewed risk of bladder cancer  #enlarged prostate with lower urinary tract symptoms      Plan of care discussed with patient and spouse  Surgical risks, benefits and alternatives discussed.  Risks of TURBT including but not limited to bleeding, infection, bladder injury or perforation,  possible need for post-operative catheter, possible diagnosis of cancer requiring further therapy and follow-up, and recurrence with need to repeat procedure reviewed.  Patient will schedule TURBT with possible bilateral RPG and ureter stents placement          Adi Murphy MD  3/20/2025    Supplementary Documentation:                                      [1]   Allergies  Allergen Reactions    Pcns [Penicillins] HIVES    Norco [Hydrocodone-Acetaminophen] NAUSEA AND VOMITING     Pt had severe reaction/vomiting with NORCO   [2]   No current facility-administered medications on file prior to encounter.     Current Outpatient Medications on File Prior to Encounter   Medication Sig Dispense Refill    amLODIPine 5 MG Oral Tab Take 1 tablet (5 mg total) by mouth daily.      GLUCOSAMINE-CHONDROITIN OR Take 1 tablet by mouth daily.      ALFUZOSIN 10 MG Oral Tablet 24 Hr TAKE 1 TABLET BY MOUTH EVERY DAY 90 tablet 0    [DISCONTINUED] amLODIPine 10 MG Oral Tab Take 1 tablet (10 mg total) by mouth daily. 90 tablet 3

## 2025-03-20 NOTE — OPERATIVE REPORT
[facility]   part of [facility]    Operative Note         Cheng Fuentes Location: OR   [facility] [id number] MRN [id number]   Admission Date 3/20/2025 Operation Date 3/20/2025   Attending Physician Adi Murphy MD       Patient Name: Cheng Fuentes     Preoperative Diagnosis: BLADDER MASS N32.89     Postoperative Diagnosis: BLADDER MASS N32.89     Procedure(s):  CYSTOSCOPY, TRANSURETHRAL RESECTION OF BLADDER TUMOR, LEFT RETROGRADE PYELOGRAM, AND LEFT  URETERAL STENT PLACEMENT   Attempted right ureter catheterization    Primary Surgeon: Adi Murphy MD           Anesthesia: General     Specimen:   ID Type Source Tests Collected by Time Destination   1 : bladder tumor at trigone Tissue Bladder SURGICAL PATHOLOGY TISSUE Adi Murphy MD 3/20/2025 0730         Estimated Blood Loss: Blood Output: 5 mL (3/20/2025  8:09 AM)       Complications: none      Indications for procedure: hematuria/bladder mass noted on CT and office cystoscopy. Advised patient and spouse of risks of cancer. Here for resection.     Surgical Findings: broad based tumor, mostly over the left trigone and posterior wall but also extending to the bladder neck and over the right trigone as well too. Able to unroof and identify the left UO, but unable to track the trigone and identify the right UO. Bladder neck at baseline is distorted as patient is s/p TURP many years ago    Complexity: NA (optional)    Operative Summary:      SPECIMENS: bladder tumor at trigone.   Size estimated: 4 cm     ESTIMATED BLOOD LOSS: Less than 5 mL.       DRAINS: 22fr 3 way short catheter, 30cc ballon. 6fr x 22cm double J ureter stent on the LEFT     INDICATION FOR PROCEDURE: Cheng is a 89 year old male who on cystoscopy for hematuria was found to have bladder mass.  We discussed the possibility of bladder cancer and the need for biopsy and formal removal. CT does not show any hydronephrosis but the trigone is obscured by the mass. The patient understands the risks of the  procedure, including but not limited to a general anesthetic, bleeding, infection, bladder injury, ureteral injury or obstruction, urethral injury, bladder perforation, the possible need for Van catheter, postoperative dysuria, urgency, frequency, incontinence, and retention. The patient understands the risk of recurrence and the possible need for a second look.  The patient would like to proceed.      PROCEDURE IN DETAIL: The patient was brought to the operating room after being correctly identified and surgical consent was obtained. All questions were answered. General anesthesia was induced without difficulty. The legs were placed in the dorsal lithotomy position, padded and secured, and the area were prepped and draped in the usual sterile fashion. A 22-Vietnamese cystoscope was used to inspect the bladder and urethra. Prostate is enlarged. Lateral lobes have started to grow back.  Bladder neck is open without any tight contracture. There is still some residual resection undermining the bladder neck.  Bladder urine is grossly non bloody.  There is papillary and mass tumor over the trigone extending to the bladder neck. More on the left than the right.  Appearance of bladder cancer. Closer to the bladder neck, it is hard to grossly tell what is regrowth of bulbous prostate vs more solid bladder mass.  Dome and lateral walls and anterior bladder neck are clear. No stone. No cystitis. No diverticuli. 2+ trabeculation. I cannot see the orifices. A 26-Vietnamese continuous flow resectoscope was inserted with the 24-Vietnamese wire loop. The primary tumor was estimated to be 4cm in size.  Broad base extending over the trigone  Using the bipolar resectoscope loop, the tumor was removed. Fulguration was used to control for hemostasis. There was no evidence of any bladder perforation. I was able to unroof the left UO, but despite symmetric resection extending to the right trigone could not find the right UO.  Tumor chips were  irrigated out of the bladder. I did have to resect down the bladder neck to be able to grossly resect all of the visible tumor.  Using the cystoscope the left UO was cannulated.  5fr open ended catheter passed.  There is mild hydronephrosis vs extrarenal pelvis.  6fr x 22cm double J ureter stent placed with proximal pigtail in the renal pelvis and distal pigtail against the UO.  I tried to cannulate the area of the right trigone but could not pass anything up. There was an area of muscle defect that could have been the UO but the wire did not pass upward. Attempts further for right retrograde or stent placement were aborted.  With good hemostasis, scope was removed. 22fr 3 way short catheter was placed. Balloon filled with 30ml sterile water. CBI started. Urine is clear. At the end of the procedure, the legs were brought down to the supine position, and the patient was extubated, and transported to the recovery room in stable condition. I was present and performed the entire procedure as stated above. There were no complications.     He will be monitored overnight and dc home with short.  Voiding trial is planned for next week in the office. Pathology is pending       Implants:   Implant Name Type Inv. Item Serial No.  Lot No. LRB No. Used Action   STENT URET 6FR L22CM DIA2MM ASCERTA - SN/A  STENT URET 6FR L22CM DIA2MM ASCERTA N/A [employer] WD [id number] Left 1 Implanted        Drains: 3 way short, left ureter stent     Condition: stable       Adi Murphy MD

## 2025-03-20 NOTE — ANESTHESIA POSTPROCEDURE EVALUATION
[facility] ISABEL CrReading Patient Status:  Outpatient in a Bed   Age/Gender 89 year old male MRN [id number]   Location [facility] POST ANESTHESIA CARE UNIT Attending Adi Murphy MD   Hosp Day # 0 PCP Lan Wen MD       Anesthesia Post-op Note    CYSTOSCOPY, TRANSURETHRAL RESECTION OF BLADDER TUMOR, LEFT RETROGRADE PYELOGRAM, AND LEFT  URETERAL STENT PLACEMENT    Procedure Summary       Date: 03/20/25 Room / Location:  MAIN OR 07 /  MAIN OR    Anesthesia Start: 0702 Anesthesia Stop: 0824    Procedure: CYSTOSCOPY, TRANSURETHRAL RESECTION OF BLADDER TUMOR, LEFT RETROGRADE PYELOGRAM, AND LEFT  URETERAL STENT PLACEMENT (Bilateral) Diagnosis: (BLADDER MASS N32.89)    Surgeons: Adi Murphy MD Anesthesiologist: Aubrey Khan MD    Anesthesia Type: general ASA Status: 2            Anesthesia Type: general    Vitals Value Taken Time   /82 03/20/25 0835   Temp 97.4 °F (36.3 °C) 03/20/25 0825   Pulse 66 03/20/25 0835   Resp 11 03/20/25 0835   SpO2 94 % 03/20/25 0835   Vitals shown include unfiled device data.        Patient Location: PACU    Anesthesia Type: general    Airway Patency: patent    Postop Pain Control: adequate    Mental Status: preanesthetic baseline    Nausea/Vomiting: none    Cardiopulmonary/Hydration status: stable euvolemic    Complications: no apparent anesthesia related complications    Postop vital signs: stable    Dental Exam: Unchanged from Preop    Patient to be discharged from PACU when criteria met.

## 2025-03-21 VITALS
RESPIRATION RATE: 18 BRPM | BODY MASS INDEX: 25.16 KG/M2 | OXYGEN SATURATION: 95 % | HEIGHT: 68 IN | TEMPERATURE: 98 F | DIASTOLIC BLOOD PRESSURE: 62 MMHG | HEART RATE: 59 BPM | WEIGHT: 166 LBS | SYSTOLIC BLOOD PRESSURE: 160 MMHG

## 2025-03-21 LAB — PLATELET # BLD AUTO: 158 10(3)UL (ref 150–450)

## 2025-03-21 PROCEDURE — 85049 AUTOMATED PLATELET COUNT: CPT | Performed by: INTERNAL MEDICINE

## 2025-03-21 RX ORDER — TAMSULOSIN HYDROCHLORIDE 0.4 MG/1
0.4 CAPSULE ORAL NIGHTLY
Qty: 90 CAPSULE | Refills: 0 | Status: SHIPPED | OUTPATIENT
Start: 2025-03-21 | End: 2025-03-21

## 2025-03-21 RX ORDER — TAMSULOSIN HYDROCHLORIDE 0.4 MG/1
0.4 CAPSULE ORAL NIGHTLY
Qty: 90 CAPSULE | Refills: 0 | Status: SHIPPED | OUTPATIENT
Start: 2025-03-21 | End: 2025-06-19

## 2025-03-21 NOTE — DISCHARGE INSTRUCTIONS
Please seek medical attention if your symptoms do not improve or get worse    Notify physician if you experience any of the followin. Fever  2.. persistent Nausea/vomiting  3. severe uncontrolled pain  4. redness, tenderness, or signs of infection (pain, swelling, redness, odor or green/yellow discharge around incision site)  5. difficulty breathing, headache or visual disturbances  6. hives  7. persistent dizziness or light-headedness  8. extreme fatigue  9. Numbness/tingling  10. Difficulty urinating or defecating  11. Bleeding - significant blood in catheter bag    Do not drive when taking pain medications    Do not exceed 4 g acetaminophen within 24 hours    [facility]  [phone]

## 2025-03-21 NOTE — PROGRESS NOTES
A&Ox4. RA. YASIR Van with CBI in place and running moderate/fast. Denies any pain or SOB or nausea. Pt reports that the pain they felt in their jaw earlier is resolved. Safety measures in place, all questions and concerns address. Call light within reach.

## 2025-03-21 NOTE — PROGRESS NOTES
[facility]   part of [facility] Hospitalist Progress Note     Cheng Fuentes Patient Status:  Outpatient in a Bed    9/10/1935 MRN [id number]   Location [facility] 3NW-A Attending Adi Murphy MD   Hosp Day # 0 PCP Lan Wen MD     Reason for consult:   Medical co management     Requested by:   Dr Murphy    Subjective:     Patient seen and examined.   Jaw pain resolved  No issues overnight  afebrile    Objective:    Review of Systems:   10 point ROS completed and was negative, except for pertinent positive and negatives stated in subjective.    Vital signs:  Temp:  [97.4 °F (36.3 °C)-98.5 °F (36.9 °C)] 97.7 °F (36.5 °C)  Pulse:  [58-91] 60  Resp:  [11-26] 16  BP: (133-211)/() 162/70  SpO2:  [92 %-100 %] 100 %    Physical Exam:    General: No acute distress.   HEENT:  EOMI, PERRLA, OP clear  Respiratory: Clear to auscultation bilaterally. No wheezes. No rhonchi.  Cardiovascular: S1, S2. Regular rate and rhythm. No murmurs.  Abdomen: Soft, nontender, nondistended.  Positive bowel sounds. No rebound or guarding.  Extremities: No edema.  Neuro:  Grossly non focal, no motor deficits.        Diagnostic Data:    Labs:  Recent Labs   Lab 25  0506   .0       Recent Labs   Lab 25   CREATSERUM 1.62*       Estimated Creatinine Clearance: 30.5 mL/min (A) (based on SCr of 1.62 mg/dL (H)).    No results for input(s): \"PTP\", \"INR\" in the last 168 hours.         COVID-19 Lab Results    COVID-19  Lab Results   Component Value Date    COVID19 Not Detected 2022    COVID19 Not Detected 2021       Pro-Calcitonin  No results for input(s): \"PCT\" in the last 168 hours.    Cardiac  No results for input(s): \"TROP\", \"PBNP\" in the last 168 hours.    Creatinine Kinase  No results for input(s): \"CK\" in the last 168 hours.    Inflammatory Markers  No results for input(s): \"CRP\", \"DONAVON\", \"LDH\", \"DDIMER\" in the last 168 hours.    Imaging: Imaging data  reviewed in Epic.    Medications:    tamsulosin  0.4 mg Oral Daily @ 0700    amLODIPine  5 mg Oral Daily    enoxaparin  40 mg Subcutaneous Nightly    docusate sodium  100 mg Oral Daily       Assessment & Plan:    Cheng Fuentes is a 89 year old male with PMH Sig thickened for BPH, dementia with mild cognitive impairment, HTN, DL, GERD, CKD 3, presents following transurethral resection of bladder tumor.       Bladder tumor s/p TURBT 3/20/25  BPH  Post op pain  Main management per Urology  service, including pain control, wound care, DVT prophylaxis, and disposition  Encourage early ambulation  Encourage I-S use  CBI per urology >> plan to dc with short     Right jaw pain - resolved  -xray TMJ ordered but pt refused as pain had resolved  -unclear etiology  -possible injury during intubation?     HTN  DL  -resume home med: almodipine     GERD  -PPI     CKD III  -monitor labs    Dementia w/ MCI  -resume home meds if any         DISPO:  Main dc plan per urology  From medical standpoint ok for dc  Medical portion of med rec done       Thank you for allowing me to participate in the care of this patient.  I will be following the patient while she is in the hospital.           Mago Stover MD  [facility]  Pager [phone]  Answering Service number: [phone]

## 2025-03-21 NOTE — PROGRESS NOTES
AVS reviewed, IV removed, Leg bag & Van teaching completed, Flomax sent to own pharmacy , verbalized understanding of discharge instructions, wife in room.   Respiratory

## 2025-03-21 NOTE — CM/SW NOTE
CM met w/ pt and sps to discuss POLST completion. Pt confirms his wishes for DNAR/Select. Form completed/ signed by pt. MD contacted for signature.       1200: Signed copy of POLST given to pt. Copy in pt's chart to be scanned into his medical records.     CM/SW will remain available for DC planning and/or support.     ILYA BaezN, CMSRN    [id number]

## 2025-03-21 NOTE — PROGRESS NOTES
03/21/25 1439   Bladder Irrigation   Manual Irrigation Patent   Irrigant Normal saline   Flow Clamped   CBI Irrigation Intake (mL) 06399 mL   CBI Van Output (mL) 12109 mL   CBI Net Output (mL) 1600 mL   Van Urine Color Cherry   Van Urine Clarity Clear   How tolerated? Tolerated well

## 2025-03-21 NOTE — PLAN OF CARE
Alert and oriented x4. Pain managed, denies jaw pain, denies urethral pain, denies bladder spasms. Up to bathroom and ambulated in room standby assist without lightheadedness.   Regular diet tolerating without nausea.  BM this morning.  Afebrile.  CBI clamped per urology PA, tolerated well.     Denies chest pain, dyspnea, lightheadedness.

## 2025-03-21 NOTE — PROGRESS NOTES
[facility]   part of [facility]    Progress Note    Cheng Fuentes Patient Status:  Outpatient in a Bed    9/10/1935 MRN [id number]   Location [facility] 3NW-A Attending Adi Murphy MD   Hosp Day # 0 PCP Lan Wen MD     Subjective:   Cheng Fuentes is a(n) 89 year old male POD 1 TURBT admitted for cbi  Urine remains clear. No complaints. Had some jaw pain which has now resolved.   No flank pain  S/p CYSTOSCOPY, TRANSURETHRAL RESECTION OF BLADDER TUMOR, LEFT RETROGRADE PYELOGRAM, AND LEFT  URETERAL STENT PLACEMENT   Attempted right ureter catheterization, unable to track right ureter orifice    Objective:   Blood pressure 160/62, pulse 59, temperature 97.7 °F (36.5 °C), temperature source Oral, resp. rate 18, height 5' 8\" (1.727 m), weight 166 lb (75.3 kg), SpO2 95%.    General appearance: alert, appears stated age, and cooperative  Abdominal: soft, non-tender; bowel sounds normal; no masses,  no organomegaly  Male genitalia: normal ifc draining pale pink urine, no clots      Results:   Lab Results   Component Value Date    WBC 8.3 2022    HGB 14.2 2022    HCT 42.2 2022    .0 2025    CREATSERUM 1.62 (H) 2025    BUN 12 2022     2022    K 3.6 2022     2022    CO2 22.0 2022    GLU 88 2022    CA 8.6 2022    ALB 4.1 2022    ALKPHO 110 2022    BILT 0.69 2022    TP 6.7 2022    AST 12 2022    ALT 9 2022    PTT 26.1 2015    INR 1.07 2015    T4F 1.01 02/15/2022    TSH 1.530 02/15/2022    LIP 19 2022    PSA 2.83 02/15/2022    MG 2.5 2021    TROPHS 6 2021       XR OR - N/C    Result Date: 3/20/2025  CONCLUSION:  As above.   LOCATION:  [facility]    Dictated by (CST): Viky Ayala DO on 3/20/2025 at 11:29 AM     Finalized by (CST): Viky Ayala DO on 3/20/2025 at 11:30 AM            Assessment & Plan:   Gross hematuria  Bladder mass  S/p   TRANSURETHRAL RESECTION OF BLADDER TUMOR, LEFT  URETERAL STENT PLACEMENT   Attempted right ureter catheterization, unable to track right ureter orifice    Doing well  Stable from a urologic standpoint for dc home. With short.  Outpt follow up to review path   Scheduled for void trial Lynne Munson PA-C  Department of Urology  [facility]  [phone]     3/21/2025

## 2025-03-23 ENCOUNTER — HOSPITAL ENCOUNTER (EMERGENCY)
Facility: HOSPITAL | Age: 89
Discharge: HOME | End: 2025-03-23
Attending: EMERGENCY MEDICINE
Payer: MEDICARE

## 2025-03-23 VITALS
TEMPERATURE: 98 F | BODY MASS INDEX: 25 KG/M2 | SYSTOLIC BLOOD PRESSURE: 179 MMHG | RESPIRATION RATE: 18 BRPM | HEART RATE: 74 BPM | DIASTOLIC BLOOD PRESSURE: 99 MMHG | WEIGHT: 165.38 LBS | OXYGEN SATURATION: 96 %

## 2025-03-23 DIAGNOSIS — N39.0 ACUTE UTI: ICD-10-CM

## 2025-03-23 DIAGNOSIS — R30.0 DYSURIA: Primary | ICD-10-CM

## 2025-03-23 LAB
RBC #/AREA URNS AUTO: >10 /HPF
SP GR UR REFRACTOMETRY: 1 (ref 1–1.03)
WBC #/AREA URNS AUTO: >50 /HPF

## 2025-03-23 PROCEDURE — 87086 URINE CULTURE/COLONY COUNT: CPT | Performed by: EMERGENCY MEDICINE

## 2025-03-23 PROCEDURE — 81001 URINALYSIS AUTO W/SCOPE: CPT | Performed by: EMERGENCY MEDICINE

## 2025-03-23 PROCEDURE — 99283 EMERGENCY DEPT VISIT LOW MDM: CPT

## 2025-03-23 PROCEDURE — 99284 EMERGENCY DEPT VISIT MOD MDM: CPT

## 2025-03-23 RX ORDER — CIPROFLOXACIN 500 MG/1
500 TABLET, FILM COATED ORAL
Status: DISCONTINUED | OUTPATIENT
Start: 2025-03-23 | End: 2025-03-23

## 2025-03-23 RX ORDER — PHENAZOPYRIDINE HYDROCHLORIDE 200 MG/1
100 TABLET, FILM COATED ORAL ONCE
Status: COMPLETED | OUTPATIENT
Start: 2025-03-23 | End: 2025-03-23

## 2025-03-23 RX ORDER — LIDOCAINE HYDROCHLORIDE 20 MG/ML
SOLUTION OROPHARYNGEAL
Qty: 100 ML | Refills: 0 | Status: SHIPPED | OUTPATIENT
Start: 2025-03-23

## 2025-03-23 RX ORDER — PHENAZOPYRIDINE HYDROCHLORIDE 200 MG/1
200 TABLET, FILM COATED ORAL 3 TIMES DAILY PRN
Qty: 9 TABLET | Refills: 0 | Status: SHIPPED | OUTPATIENT
Start: 2025-03-23

## 2025-03-23 RX ORDER — CIPROFLOXACIN 500 MG/1
500 TABLET, FILM COATED ORAL 2 TIMES DAILY
Qty: 20 TABLET | Refills: 0 | Status: SHIPPED | OUTPATIENT
Start: 2025-03-23 | End: 2025-04-02

## 2025-03-23 RX ORDER — LIDOCAINE HYDROCHLORIDE 20 MG/ML
10 SOLUTION OROPHARYNGEAL ONCE
Status: COMPLETED | OUTPATIENT
Start: 2025-03-23 | End: 2025-03-23

## 2025-03-23 NOTE — ED PROVIDER NOTES
Patient Seen in: [facility]      History     Chief Complaint   Patient presents with    Cath Tube Problem     Stated Complaint: Van leaking    Subjective:   Patient is 89-year-old male with recent TURP procedure he was got a indwelling Van catheter that is scheduled to be removed on Tuesday.  He had significant hematuria with it following the procedure but has been lessening in the deepness of the red that has been coming in his urine.  However today he has been having burning with urination.  He reports that discomfort and burning sensation every time he urinates.  Patient has never had a UTI.  He has some leaking around the Van catheter.  Explained and normally we would remove the Van catheter however since he is status post TURP would recommend for Van in place follow-up with his urologist and him at that time they can determine if they want to remove the catheter.  He is not taking medication for bladder spasm.  He denies any fevers.  He is nontoxic appearing    The history is provided by the patient and the spouse.             Objective:     Past Medical History:    BPH (benign prostatic hyperplasia)    Colitis    Dementia (HCC)    Elevated PSA    Esophageal reflux    Gross hematuria    Hearing impairment    has bilateral hearing aids; wears mostlty just for the right ear    Hearing loss    High blood pressure    High cholesterol    Denies    MCI (mild cognitive impairment)    MMSE 29/20 on 11/26/2013    Osteoarthritis    Renal disorder    Acute renal failure with stents 1/5/2016    Syncope and collapse    Unspecified essential hypertension    Visual impairment    Glasses              Past Surgical History:   Procedure Laterality Date    Appendectomy      Colonoscopy N/A 07/20/2022    Procedure: COLONOSCOPY with forcep polypectomy and endoscopic mucosal resection and clip placement x1;  Surgeon: Jayce Rose MD;  Location:  ENDOSCOPY    Cystourethroscopy   2013    Cystoscopy - Dr. Smith     Cystourethroscopy,fulgur 2-5cm lesn  2025    TURBT with left ureter stent placement    Laparoscopic repair of initial Right 2015    Procedure: LAPAROSCOPIC INGUINAL HERNIORRAPHY;  Surgeon: Lan Jefferson MD;  [facility]    Other surgical history  2013    Flow US - Dr. Smith    Transurethral elec-surg prostatectom  2016    Cysto, TURP - Dr. Thornton                Social History     Socioeconomic History    Marital status:    Tobacco Use    Smoking status: Former     Current packs/day: 0.00     Types: Cigarettes     Quit date: 2015     Years since quittin.3    Smokeless tobacco: Former    Tobacco comments:     chewed for 40 yrs   Vaping Use    Vaping status: Never Used   Substance and Sexual Activity    Alcohol use: Yes     Comment: occasionally/2 beers    Drug use: No     Social Drivers of Health     Food Insecurity: No Food Insecurity (3/20/2025)    NCSS - Food Insecurity     Worried About Running Out of Food in the Last Year: No     Ran Out of Food in the Last Year: No   Transportation Needs: No Transportation Needs (3/20/2025)    NCSS - Transportation     Lack of Transportation: No   Housing Stability: Not At Risk (3/20/2025)    NCSS - Housing/Utilities     Has Housing: Yes     Worried About Losing Housing: No     Unable to Get Utilities: No                  Physical Exam     ED Triage Vitals [25 0406]   BP (!) 179/99   Pulse 74   Resp 18   Temp 98 °F (36.7 °C)   Temp src Temporal   SpO2 96 %   O2 Device None (Room air)       Current Vitals:   Vital Signs  BP: (!) 179/99  Pulse: 74  Resp: 18  Temp: 98 °F (36.7 °C)  Temp src: Temporal    Oxygen Therapy  SpO2: 96 %  O2 Device: None (Room air)        Physical Exam  Vitals and nursing note reviewed.   Constitutional:       General: He is not in acute distress.     Appearance: Normal appearance. He is normal weight. He is not toxic-appearing.   HENT:       Head: Normocephalic and atraumatic.      Nose: Nose normal.      Mouth/Throat:      Mouth: Mucous membranes are moist.   Eyes:      Comments: Disconjugate gaze   Cardiovascular:      Rate and Rhythm: Normal rate and regular rhythm.      Pulses: Normal pulses.   Pulmonary:      Effort: Pulmonary effort is normal.      Breath sounds: Normal breath sounds. No wheezing.   Abdominal:      General: There is no distension.      Palpations: Abdomen is soft.      Tenderness: There is no abdominal tenderness. There is no guarding or rebound.   Genitourinary:     Comments: Indwelling Van catheter  Musculoskeletal:         General: No swelling or deformity. Normal range of motion.      Cervical back: Neck supple.   Skin:     General: Skin is warm.      Capillary Refill: Capillary refill takes less than 2 seconds.   Neurological:      General: No focal deficit present.      Mental Status: He is alert and oriented to person, place, and time.   Psychiatric:         Mood and Affect: Mood normal.         Behavior: Behavior normal.             ED Course     Labs Reviewed   URINALYSIS WITH CULTURE REFLEX - Abnormal; Notable for the following components:       Result Value    Urine Color Brown (*)     Clarity Urine Ex.Turbid (*)     WBC Urine >50 (*)     RBC Urine >10 (*)     All other components within normal limits   SPECIFIC GRAVITY, URINE   URINE CULTURE, ROUTINE                   MDM      Social -negative tobacco, negative etoh, negative drugs  Family History-noncontributory  Past Medical History-BPH, high blood pressure, colitis, hearing loss, dementia, high cholesterol, GERD    Differential diagnosis before testing included dysuria, bladder spasm, UTI, Van catheter malfunction    Co-morbidities that add to the complexity of management include:,  indwelling Van catheter placed status post TURP    Testing ordered during this visit included urinalysis    Radiographic images  None    External chart review showed review of Care  Everywhere in epic system shows no related comorbidities to current presentation other than recent surgery on 20 March by Dr. dunne    History obtained by an independent source included from patient, family    Discussion of management with patient, family    Social determinants of health that affect care include not applicable      Medications Provided: Pyridium    Course of Events during Emergency Room Visit include 89-year-old male presents emergency room with bladder spasm and discomfort with urination.  Marco urinalysis.  Patient given Pyridium and topical lidocaine to help with discomfort.  If UTI seen on imaging will get or antibiotics.  He has a penicillin allergy listed in the medical history and does not know what the results of the exposure to penicillin or other than hives and facial swelling in the past.  It has been more than 50 years since he had penicillin per patient.  There is no recent urinalysis showing culture sensitivities will try Cipro as an alternative    Patient's urinalysis shows UTI.  Will start on Cipro given penicillin allergy give topical lidocaine for pain control and Pyridium for home.  Patient to follow-up with urology as outpatient  Disposition:          Discharge  I have discussed with the patient the results of test, differential diagnosis, treatment plan, warning signs and symptoms which should prompt immediate return.  They expressed understanding of these instructions and agrees to the following plan provided.  They were given written discharge instructions and agrees to return for any concerns and voiced understanding and all questions were answered.           Medical Decision Making      Disposition and Plan     Clinical Impression:  1. Dysuria    2. Acute UTI         Disposition:  Discharge  3/23/2025  5:24 am    Follow-up:  Lan Wen MD  [address] [address]  [phone]    Schedule an appointment as soon as possible for a  visit      Adi Murphy MD  [address]  [address]  [facility] [address]  [phone]    Schedule an appointment as soon as possible for a visit            Medications Prescribed:  Current Discharge Medication List        START taking these medications    Details   ciprofloxacin 500 MG Oral Tab Take 1 tablet (500 mg total) by mouth 2 (two) times daily for 10 days.  Qty: 20 tablet, Refills: 0      Lidocaine Viscous HCl 2 % Mouth/Throat Solution Apply 5ml topically to mucus membrane q 3 hours prn pain  Qty: 100 mL, Refills: 0                 Supplementary Documentation:

## (undated) DEVICE — GUIDEWIRE .035X150 STR ZIPWIRE

## (undated) DEVICE — BAG,DRAINAGE,4L,A/R TOWER,METAL CLAMP: Brand: MEDLINE

## (undated) DEVICE — CLIP LGT 11MM OPEN 2.8MM 235CM

## (undated) DEVICE — Device: Brand: DEFENDO AIR/WATER/SUCTION AND BIOPSY VALVE

## (undated) DEVICE — FORCEP RADIAL JAW 4

## (undated) DEVICE — GLOVE SUR 6.5 SENSICARE PI PIP CRM PWD F

## (undated) DEVICE — SOLUTION IRRIG 1000ML ST H2O AQUALITE PLAS

## (undated) DEVICE — ORISE GEL

## (undated) DEVICE — SNARE CAPTI HEX STIFF SMALL

## (undated) DEVICE — NEEDLE CONTRAST INTERJECT 25G

## (undated) DEVICE — 20 ML SYRINGE LUER-LOCK TIP: Brand: MONOJECT

## (undated) DEVICE — 3M™ RED DOT™ MONITORING ELECTRODE WITH FOAM TAPE AND STICKY GEL, 50/BAG, 20/CASE, 72/PLT 2570: Brand: RED DOT™

## (undated) DEVICE — HF-RESECTION ELECTRODE PLASMALOOP LOOP, MEDIUM, 24 FR., 12°/16°, ESG TURIS: Brand: OLYMPUS

## (undated) DEVICE — SYRINGE MED 10ML LL TIP W/O SFTY DISP

## (undated) DEVICE — PACK PBDS CYSTOSCOPY

## (undated) DEVICE — Device

## (undated) DEVICE — ENDOSCOPY PACK - LOWER: Brand: MEDLINE INDUSTRIES, INC.

## (undated) DEVICE — GLOVE SURG SENSICARE SZ 7

## (undated) DEVICE — 1200CC GUARDIAN II: Brand: GUARDIAN

## (undated) DEVICE — FILTERLINE NASAL ADULT O2/CO2

## (undated) DEVICE — CATHETER URET 5FR L70CM FLX OPN TIP NONPORTED

## (undated) DEVICE — SOLUTION IRRIG 3000ML 0.9% NACL FLX CONT

## (undated) DEVICE — TRAP SPEC REMOVAL ETRAP 15CM

## (undated) DEVICE — SNARE 9MM 230CM 2.4MM EXACTO

## (undated) DEVICE — SLEEVE COMPR MD KNEE LEN SGL USE KENDALL SCD

## (undated) DEVICE — 3M™ TEGADERM™ TRANSPARENT FILM DRESSING FRAME STYLE, 1624W, 2-3/8 IN X 2-3/4 IN (6 CM X 7 CM), 100/CT 4CT/CASE: Brand: 3M™ TEGADERM™

## (undated) NOTE — LETTER
Patient Name: Cheng Fuentes  -Age / Sex: 9/10/1935-A: 89 y  male   Medical Records: [id number] CSN: [id number]    DNAR NOTIFICATION    Your patient listed above has a procedure scheduled at [facility] and has indicated that he/she currently has a DNAR (Do Not Attempt Resuscitattion) with their Advanced Directives.    Please note that you will be asked to address this matter with your patient pre-operatively.  Thank you.      Procedure Date 3/20/2025  Procedure CYSTOSCOPY, TRANSURETHRAL RESECTION OF BLADDER TUMOR, POSSIBLE RETROGRADE PYELOGRAM, POSSIBLE BILATERAL PLACEMENT OF URETERAL STENTS, Bilateral    Surgeon(s):  Adi Murphy MD

## (undated) NOTE — LETTER
Patient Name: Pipe Carter CSN: [id number]  -Age / Sex: 9/10/1935-A: 81 y  male Medical Records: [id number]    DNAR NOTIFICATION    Your patient listed above has a procedure scheduled at [facility] and has indicated that he/she currently has a DNAR (Do Not Attempt Resuscitate) with their Advanced Directives. Please note that you will be asked to address this matter with your patient pre-operatively. Thank you.       Procedure Date 2022  Procedure COLONOSCOPY, N/A

## (undated) NOTE — LETTER
Date & Time: 12/12/2021, 4:15 PM  Patient: Cristal   Attending Provider: Paola Jin MD      This certifies that Andrew Vieyra, a patient at an [facility], am leaving the facility voluntarily and against the advi

## (undated) NOTE — LETTER
OUTSIDE TESTING RESULT REQUEST     IMPORTANT: FOR YOUR IMMEDIATE ATTENTION  Please FAX all test results listed below to: [phone]       * * * * If testing is NOT complete, arrange with patient A.S.A.P. * * * *      Patient Name: Cheng Fuentes  Surgery Date: 3/20/2025  Medical Record: [id number]  CSN: [id number]  : 9/10/1935 - A: 89 y     Sex: male  Surgeon(s):  Adi Murphy MD  Procedure: CYSTOSCOPY, TRANSURETHRAL RESECTION OF BLADDER TUMOR, POSSIBLE RETROGRADE PYELOGRAM, POSSIBLE BILATERAL PLACEMENT OF URETERAL STENTS  Anesthesia Type: General     Surgeon: Adi Murphy MD     The following Testing and Time Line are REQUIRED PER ANESTHESIA     BMP (requires 4 hour fast) within  60 days      Thank You,   Sent by:LUIS EDUARDO Gaitan

## (undated) NOTE — LETTER
OUTSIDE TESTING RESULT REQUEST     IMPORTANT: FOR YOUR IMMEDIATE ATTENTION  Please FAX all test results listed below to: [phone]     Testing already done on or about: 25     * * * * If testing is NOT complete, arrange with patient A.S.A.P. * * * *      Patient Name: Cheng Fuentes  Surgery Date: 3/20/2025  Medical Record: [id number]  CSN: [id number]  : 9/10/1935 - A: 89 y     Sex: male  Surgeon(s):  Adi Murphy MD  Procedure: CYSTOSCOPY, TRANSURETHRAL RESECTION OF BLADDER TUMOR, POSSIBLE RETROGRADE PYELOGRAM, POSSIBLE BILATERAL PLACEMENT OF URETERAL STENTS  Anesthesia Type: General     Surgeon: Adi Murphy MD     The following Testing and Time Line are REQUIRED PER ANESTHESIA     EKG READ AND SIGNED WITHIN   90 days      Thank You,   Sent by:LUIS EDUARDO Gaitan